# Patient Record
Sex: FEMALE | Race: WHITE | NOT HISPANIC OR LATINO | Employment: OTHER | URBAN - METROPOLITAN AREA
[De-identification: names, ages, dates, MRNs, and addresses within clinical notes are randomized per-mention and may not be internally consistent; named-entity substitution may affect disease eponyms.]

---

## 2019-01-15 ENCOUNTER — HISTORICAL (OUTPATIENT)
Dept: ADMINISTRATIVE | Facility: HOSPITAL | Age: 84
End: 2019-01-15

## 2019-01-22 ENCOUNTER — HISTORICAL (OUTPATIENT)
Dept: ADMINISTRATIVE | Facility: HOSPITAL | Age: 84
End: 2019-01-22

## 2019-02-05 ENCOUNTER — HISTORICAL (OUTPATIENT)
Dept: ADMINISTRATIVE | Facility: HOSPITAL | Age: 84
End: 2019-02-05

## 2019-12-02 ENCOUNTER — HISTORICAL (OUTPATIENT)
Dept: ADMINISTRATIVE | Facility: HOSPITAL | Age: 84
End: 2019-12-02

## 2019-12-05 LAB — FINAL CULTURE: NORMAL

## 2022-04-11 ENCOUNTER — HISTORICAL (OUTPATIENT)
Dept: ADMINISTRATIVE | Facility: HOSPITAL | Age: 87
End: 2022-04-11

## 2022-04-29 VITALS
SYSTOLIC BLOOD PRESSURE: 130 MMHG | HEIGHT: 62 IN | WEIGHT: 200 LBS | BODY MASS INDEX: 36.8 KG/M2 | DIASTOLIC BLOOD PRESSURE: 80 MMHG

## 2023-05-22 ENCOUNTER — HOSPITAL ENCOUNTER (INPATIENT)
Facility: HOSPITAL | Age: 88
LOS: 1 days | Discharge: HOME HEALTH | DRG: 811 | End: 2023-05-24
Attending: EMERGENCY MEDICINE | Admitting: INTERNAL MEDICINE
Payer: MEDICARE

## 2023-05-22 DIAGNOSIS — R07.9 CHEST PAIN: ICD-10-CM

## 2023-05-22 DIAGNOSIS — D64.9 ANEMIA: ICD-10-CM

## 2023-05-22 DIAGNOSIS — R06.02 SHORTNESS OF BREATH AT REST: ICD-10-CM

## 2023-05-22 DIAGNOSIS — R06.02 SOB (SHORTNESS OF BREATH): ICD-10-CM

## 2023-05-22 LAB
ABORH RETYPE: NORMAL
ALBUMIN SERPL-MCNC: 3.6 G/DL (ref 3.4–4.8)
ALBUMIN/GLOB SERPL: 1.2 RATIO (ref 1.1–2)
ALP SERPL-CCNC: 87 UNIT/L (ref 40–150)
ALT SERPL-CCNC: 9 UNIT/L (ref 0–55)
APTT PPP: 32.8 SECONDS (ref 23.2–33.7)
AST SERPL-CCNC: 10 UNIT/L (ref 5–34)
BASOPHILS # BLD AUTO: 0.05 X10(3)/MCL
BASOPHILS NFR BLD AUTO: 0.7 %
BILIRUBIN DIRECT+TOT PNL SERPL-MCNC: 0.5 MG/DL
BUN SERPL-MCNC: 26.3 MG/DL (ref 9.8–20.1)
CALCIUM SERPL-MCNC: 9.4 MG/DL (ref 8.4–10.2)
CHLORIDE SERPL-SCNC: 110 MMOL/L (ref 98–107)
CK SERPL-CCNC: 68 U/L (ref 29–168)
CO2 SERPL-SCNC: 19 MMOL/L (ref 23–31)
CREAT SERPL-MCNC: 1.94 MG/DL (ref 0.55–1.02)
DIRECT COOMBS (DAT): NORMAL
EOSINOPHIL # BLD AUTO: 0.24 X10(3)/MCL (ref 0–0.9)
EOSINOPHIL NFR BLD AUTO: 3.4 %
ERYTHROCYTE [DISTWIDTH] IN BLOOD BY AUTOMATED COUNT: 15.5 % (ref 11.5–17)
FOLATE SERPL-MCNC: 8.5 NG/ML (ref 7–31.4)
GFR SERPLBLD CREATININE-BSD FMLA CKD-EPI: 25 MLS/MIN/1.73/M2
GLOBULIN SER-MCNC: 2.9 GM/DL (ref 2.4–3.5)
GLUCOSE SERPL-MCNC: 182 MG/DL (ref 82–115)
GROUP & RH: NORMAL
HCT VFR BLD AUTO: 26 % (ref 37–47)
HGB BLD-MCNC: 7.6 G/DL (ref 12–16)
IMM GRANULOCYTES # BLD AUTO: 0.08 X10(3)/MCL (ref 0–0.04)
IMM GRANULOCYTES NFR BLD AUTO: 1.1 %
INDIRECT COOMBS GEL: NORMAL
INR BLD: 1.06 (ref 0–1.3)
IRON SATN MFR SERPL: 5 % (ref 20–50)
IRON SERPL-MCNC: 18 UG/DL (ref 50–170)
LDH SERPL-CCNC: 181 U/L (ref 125–220)
LYMPHOCYTES # BLD AUTO: 0.92 X10(3)/MCL (ref 0.6–4.6)
LYMPHOCYTES NFR BLD AUTO: 13.1 %
MCH RBC QN AUTO: 26.8 PG (ref 27–31)
MCHC RBC AUTO-ENTMCNC: 29.2 G/DL (ref 33–36)
MCV RBC AUTO: 91.5 FL (ref 80–94)
MONOCYTES # BLD AUTO: 0.67 X10(3)/MCL (ref 0.1–1.3)
MONOCYTES NFR BLD AUTO: 9.5 %
NEUTROPHILS # BLD AUTO: 5.07 X10(3)/MCL (ref 2.1–9.2)
NEUTROPHILS NFR BLD AUTO: 72.2 %
NRBC BLD AUTO-RTO: 0 %
PLATELET # BLD AUTO: 364 X10(3)/MCL (ref 130–400)
PMV BLD AUTO: 9.4 FL (ref 7.4–10.4)
POCT GLUCOSE: 131 MG/DL (ref 70–110)
POTASSIUM SERPL-SCNC: 4.4 MMOL/L (ref 3.5–5.1)
PROT SERPL-MCNC: 6.5 GM/DL (ref 5.8–7.6)
PROTHROMBIN TIME: 13.7 SECONDS (ref 12.5–14.5)
RBC # BLD AUTO: 2.84 X10(6)/MCL (ref 4.2–5.4)
RET# (OHS): 0.05 (ref 0.02–0.08)
RETICULOCYTE COUNT AUTOMATED (OLG): 1.86 % (ref 1.1–2.1)
SODIUM SERPL-SCNC: 139 MMOL/L (ref 136–145)
SPECIMEN OUTDATE: NORMAL
TIBC SERPL-MCNC: 333 UG/DL (ref 70–310)
TIBC SERPL-MCNC: 351 UG/DL (ref 250–450)
TRANSFERRIN SERPL-MCNC: 327 MG/DL
TROPONIN I SERPL-MCNC: 0.01 NG/ML (ref 0–0.04)
VIT B12 SERPL-MCNC: 290 PG/ML (ref 213–816)
WBC # SPEC AUTO: 7.03 X10(3)/MCL (ref 4.5–11.5)

## 2023-05-22 PROCEDURE — 80053 COMPREHEN METABOLIC PANEL: CPT | Performed by: PHYSICIAN ASSISTANT

## 2023-05-22 PROCEDURE — 82550 ASSAY OF CK (CPK): CPT | Performed by: EMERGENCY MEDICINE

## 2023-05-22 PROCEDURE — 85730 THROMBOPLASTIN TIME PARTIAL: CPT | Performed by: PHYSICIAN ASSISTANT

## 2023-05-22 PROCEDURE — 85025 COMPLETE CBC W/AUTO DIFF WBC: CPT | Performed by: PHYSICIAN ASSISTANT

## 2023-05-22 PROCEDURE — 82746 ASSAY OF FOLIC ACID SERUM: CPT | Performed by: EMERGENCY MEDICINE

## 2023-05-22 PROCEDURE — 82962 GLUCOSE BLOOD TEST: CPT

## 2023-05-22 PROCEDURE — P9016 RBC LEUKOCYTES REDUCED: HCPCS | Performed by: EMERGENCY MEDICINE

## 2023-05-22 PROCEDURE — 83615 LACTATE (LD) (LDH) ENZYME: CPT | Performed by: EMERGENCY MEDICINE

## 2023-05-22 PROCEDURE — 86880 COOMBS TEST DIRECT: CPT | Performed by: EMERGENCY MEDICINE

## 2023-05-22 PROCEDURE — 86923 COMPATIBILITY TEST ELECTRIC: CPT | Performed by: EMERGENCY MEDICINE

## 2023-05-22 PROCEDURE — 82607 VITAMIN B-12: CPT | Performed by: EMERGENCY MEDICINE

## 2023-05-22 PROCEDURE — 83550 IRON BINDING TEST: CPT | Performed by: EMERGENCY MEDICINE

## 2023-05-22 PROCEDURE — 85045 AUTOMATED RETICULOCYTE COUNT: CPT | Performed by: EMERGENCY MEDICINE

## 2023-05-22 PROCEDURE — 85610 PROTHROMBIN TIME: CPT | Performed by: PHYSICIAN ASSISTANT

## 2023-05-22 PROCEDURE — 84484 ASSAY OF TROPONIN QUANT: CPT | Performed by: PHYSICIAN ASSISTANT

## 2023-05-22 PROCEDURE — 93005 ELECTROCARDIOGRAM TRACING: CPT

## 2023-05-22 PROCEDURE — 63600175 PHARM REV CODE 636 W HCPCS: Performed by: EMERGENCY MEDICINE

## 2023-05-22 PROCEDURE — 83880 ASSAY OF NATRIURETIC PEPTIDE: CPT | Performed by: EMERGENCY MEDICINE

## 2023-05-22 PROCEDURE — 86900 BLOOD TYPING SEROLOGIC ABO: CPT | Performed by: PHYSICIAN ASSISTANT

## 2023-05-22 PROCEDURE — 96375 TX/PRO/DX INJ NEW DRUG ADDON: CPT

## 2023-05-22 PROCEDURE — 25000003 PHARM REV CODE 250: Performed by: EMERGENCY MEDICINE

## 2023-05-22 PROCEDURE — 96374 THER/PROPH/DIAG INJ IV PUSH: CPT

## 2023-05-22 RX ORDER — LABETALOL HYDROCHLORIDE 5 MG/ML
10 INJECTION, SOLUTION INTRAVENOUS EVERY 4 HOURS PRN
Status: DISCONTINUED | OUTPATIENT
Start: 2023-05-22 | End: 2023-05-24 | Stop reason: HOSPADM

## 2023-05-22 RX ORDER — GABAPENTIN 300 MG/1
300 CAPSULE ORAL 3 TIMES DAILY
COMMUNITY

## 2023-05-22 RX ORDER — POLYETHYLENE GLYCOL 3350 17 G/17G
17 POWDER, FOR SOLUTION ORAL DAILY
COMMUNITY

## 2023-05-22 RX ORDER — LABETALOL HYDROCHLORIDE 5 MG/ML
10 INJECTION, SOLUTION INTRAVENOUS
Status: COMPLETED | OUTPATIENT
Start: 2023-05-22 | End: 2023-05-22

## 2023-05-22 RX ORDER — ALBUTEROL SULFATE 0.63 MG/3ML
0.63 SOLUTION RESPIRATORY (INHALATION) EVERY 6 HOURS PRN
COMMUNITY
End: 2023-05-22 | Stop reason: CLARIF

## 2023-05-22 RX ORDER — CHLORTHALIDONE 25 MG/1
25 TABLET ORAL DAILY
Status: ON HOLD | COMMUNITY
End: 2023-07-13 | Stop reason: HOSPADM

## 2023-05-22 RX ORDER — FUROSEMIDE 10 MG/ML
40 INJECTION INTRAMUSCULAR; INTRAVENOUS
Status: COMPLETED | OUTPATIENT
Start: 2023-05-22 | End: 2023-05-22

## 2023-05-22 RX ORDER — HYDROCODONE BITARTRATE AND ACETAMINOPHEN 500; 5 MG/1; MG/1
TABLET ORAL
Status: DISCONTINUED | OUTPATIENT
Start: 2023-05-22 | End: 2023-05-24 | Stop reason: HOSPADM

## 2023-05-22 RX ORDER — POTASSIUM CHLORIDE 750 MG/1
10 TABLET, EXTENDED RELEASE ORAL DAILY
Status: ON HOLD | COMMUNITY
End: 2023-07-13 | Stop reason: HOSPADM

## 2023-05-22 RX ORDER — ALBUTEROL SULFATE 90 UG/1
2 AEROSOL, METERED RESPIRATORY (INHALATION) EVERY 6 HOURS PRN
COMMUNITY

## 2023-05-22 RX ADMIN — FUROSEMIDE 40 MG: 10 INJECTION, SOLUTION INTRAMUSCULAR; INTRAVENOUS at 09:05

## 2023-05-22 RX ADMIN — LABETALOL HYDROCHLORIDE 10 MG: 5 INJECTION, SOLUTION INTRAVENOUS at 11:05

## 2023-05-22 NOTE — FIRST PROVIDER EVALUATION
Medical screening examination initiated.  I have conducted a focused provider triage encounter, findings are as follows:    Brief history of present illness:  87-year-old female presents to ED for evaluation of increasing shortness of breath and bilateral leg swelling.  States that she saw her PCP with outpatient blood work called and told that she had low blood counts.  Denies any bleeding or dark stools.    Vitals:    05/22/23 1109   BP: (!) 149/61   Pulse: 85   Resp: 18   Temp: 99.3 °F (37.4 °C)   TempSrc: Oral   SpO2: 96%   Weight: 90.7 kg (200 lb)   Height: 5' (1.524 m)       Pertinent physical exam:  Patient awake and alert ambulatory into triage.    Brief workup plan:  Labs, EKG, chest x-ray, type and screen    Preliminary workup initiated; this workup will be continued and followed by the physician or advanced practice provider that is assigned to the patient when roomed.

## 2023-05-22 NOTE — ED NOTES
Pt alert and oriented. Lobby round. Vitals assessed . Pt does not appear to be in any immediate distress at this time.

## 2023-05-23 PROBLEM — R06.02 SHORTNESS OF BREATH: Status: ACTIVE | Noted: 2023-05-23

## 2023-05-23 LAB
ABO + RH BLD: NORMAL
ABO + RH BLD: NORMAL
AV INDEX (PROSTH): 0.4
AV MEAN GRADIENT: 16 MMHG
AV PEAK GRADIENT: 30 MMHG
AV VALVE AREA: 1.25 CM2
AV VELOCITY RATIO: 0.51
BLD PROD TYP BPU: NORMAL
BLD PROD TYP BPU: NORMAL
BLOOD UNIT EXPIRATION DATE: NORMAL
BLOOD UNIT EXPIRATION DATE: NORMAL
BLOOD UNIT TYPE CODE: 5100
BLOOD UNIT TYPE CODE: 5100
BNP BLD-MCNC: 84.4 PG/ML
BSA FOR ECHO PROCEDURE: 1.96 M2
CROSSMATCH INTERPRETATION: NORMAL
CROSSMATCH INTERPRETATION: NORMAL
CV ECHO LV RWT: 0.57 CM
DISPENSE STATUS: NORMAL
DISPENSE STATUS: NORMAL
DOP CALC AO PEAK VEL: 2.73 M/S
DOP CALC AO VTI: 57.8 CM
DOP CALC LVOT AREA: 3.1 CM2
DOP CALC LVOT DIAMETER: 2 CM
DOP CALC LVOT PEAK VEL: 1.39 M/S
DOP CALC LVOT STROKE VOLUME: 72.22 CM3
DOP CALC MV VTI: 42.6 CM
DOP CALCLVOT PEAK VEL VTI: 23 CM
E WAVE DECELERATION TIME: 277 MSEC
E/A RATIO: 0.76
E/E' RATIO: 14.77 M/S
ECHO LV POSTERIOR WALL: 1.21 CM (ref 0.6–1.1)
EJECTION FRACTION: 55 %
FRACTIONAL SHORTENING: 35 % (ref 28–44)
HCT VFR BLD AUTO: 31.7 % (ref 37–47)
HGB BLD-MCNC: 10.1 G/DL (ref 12–16)
INTERVENTRICULAR SEPTUM: 1.34 CM (ref 0.6–1.1)
LEFT ATRIUM SIZE: 4.2 CM
LEFT ATRIUM VOLUME INDEX MOD: 28.1 ML/M2
LEFT ATRIUM VOLUME MOD: 52.6 CM3
LEFT INTERNAL DIMENSION IN SYSTOLE: 2.73 CM (ref 2.1–4)
LEFT VENTRICLE DIASTOLIC VOLUME INDEX: 42.51 ML/M2
LEFT VENTRICLE DIASTOLIC VOLUME: 79.5 ML
LEFT VENTRICLE MASS INDEX: 105 G/M2
LEFT VENTRICLE SYSTOLIC VOLUME INDEX: 14.9 ML/M2
LEFT VENTRICLE SYSTOLIC VOLUME: 27.8 ML
LEFT VENTRICULAR INTERNAL DIMENSION IN DIASTOLE: 4.22 CM (ref 3.5–6)
LEFT VENTRICULAR MASS: 196.23 G
LV LATERAL E/E' RATIO: 13.71 M/S
LV SEPTAL E/E' RATIO: 16 M/S
LVOT MG: 5 MMHG
LVOT MV: 0.96 CM/S
MV MEAN GRADIENT: 3 MMHG
MV PEAK A VEL: 1.26 M/S
MV PEAK E VEL: 0.96 M/S
MV PEAK GRADIENT: 8 MMHG
MV STENOSIS PRESSURE HALF TIME: 96 MS
MV VALVE AREA BY CONTINUITY EQUATION: 1.7 CM2
MV VALVE AREA P 1/2 METHOD: 2.29 CM2
OHS LV EJECTION FRACTION SIMPSONS BIPLANE MOD: 6 %
PISA TR MAX VEL: 2.51 M/S
POCT GLUCOSE: 124 MG/DL (ref 70–110)
POCT GLUCOSE: 126 MG/DL (ref 70–110)
POCT GLUCOSE: 158 MG/DL (ref 70–110)
POCT GLUCOSE: 183 MG/DL (ref 70–110)
PV PEAK VELOCITY: 1.3 CM/S
RA PRESSURE: 3 MMHG
TDI LATERAL: 0.07 M/S
TDI SEPTAL: 0.06 M/S
TDI: 0.07 M/S
TR MAX PG: 25 MMHG
TRICUSPID ANNULAR PLANE SYSTOLIC EXCURSION: 3.26 CM
TV REST PULMONARY ARTERY PRESSURE: 28 MMHG
UNIT NUMBER: NORMAL
UNIT NUMBER: NORMAL

## 2023-05-23 PROCEDURE — 99285 EMERGENCY DEPT VISIT HI MDM: CPT | Mod: 25

## 2023-05-23 PROCEDURE — 85014 HEMATOCRIT: CPT | Performed by: INTERNAL MEDICINE

## 2023-05-23 PROCEDURE — 11000001 HC ACUTE MED/SURG PRIVATE ROOM

## 2023-05-23 PROCEDURE — 25000003 PHARM REV CODE 250: Performed by: NURSE PRACTITIONER

## 2023-05-23 PROCEDURE — 36430 TRANSFUSION BLD/BLD COMPNT: CPT

## 2023-05-23 PROCEDURE — 25000003 PHARM REV CODE 250: Performed by: INTERNAL MEDICINE

## 2023-05-23 PROCEDURE — P9016 RBC LEUKOCYTES REDUCED: HCPCS | Performed by: EMERGENCY MEDICINE

## 2023-05-23 RX ORDER — FLUTICASONE PROPIONATE AND SALMETEROL 50; 250 UG/1; UG/1
1 POWDER RESPIRATORY (INHALATION) 2 TIMES DAILY
COMMUNITY
Start: 2023-05-19

## 2023-05-23 RX ORDER — CARVEDILOL 3.12 MG/1
3.12 TABLET ORAL 2 TIMES DAILY
Status: DISCONTINUED | OUTPATIENT
Start: 2023-05-23 | End: 2023-05-24 | Stop reason: HOSPADM

## 2023-05-23 RX ORDER — ATORVASTATIN CALCIUM 10 MG/1
10 TABLET, FILM COATED ORAL DAILY
Status: DISCONTINUED | OUTPATIENT
Start: 2023-05-23 | End: 2023-05-24 | Stop reason: HOSPADM

## 2023-05-23 RX ORDER — SERTRALINE HYDROCHLORIDE 50 MG/1
100 TABLET, FILM COATED ORAL DAILY
Status: DISCONTINUED | OUTPATIENT
Start: 2023-05-23 | End: 2023-05-24 | Stop reason: HOSPADM

## 2023-05-23 RX ORDER — FLUTICASONE FUROATE AND VILANTEROL 200; 25 UG/1; UG/1
1 POWDER RESPIRATORY (INHALATION) DAILY
Status: DISCONTINUED | OUTPATIENT
Start: 2023-05-23 | End: 2023-05-24 | Stop reason: HOSPADM

## 2023-05-23 RX ORDER — GLIPIZIDE 5 MG/1
5 TABLET, FILM COATED, EXTENDED RELEASE ORAL
Status: DISCONTINUED | OUTPATIENT
Start: 2023-05-24 | End: 2023-05-24 | Stop reason: HOSPADM

## 2023-05-23 RX ORDER — NALOXONE HCL 0.4 MG/ML
0.02 VIAL (ML) INJECTION
Status: DISCONTINUED | OUTPATIENT
Start: 2023-05-23 | End: 2023-05-24 | Stop reason: HOSPADM

## 2023-05-23 RX ORDER — IBUPROFEN 200 MG
16 TABLET ORAL
Status: DISCONTINUED | OUTPATIENT
Start: 2023-05-23 | End: 2023-05-24 | Stop reason: HOSPADM

## 2023-05-23 RX ORDER — SODIUM CHLORIDE 0.9 % (FLUSH) 0.9 %
10 SYRINGE (ML) INJECTION EVERY 12 HOURS PRN
Status: DISCONTINUED | OUTPATIENT
Start: 2023-05-23 | End: 2023-05-24 | Stop reason: HOSPADM

## 2023-05-23 RX ORDER — ALPRAZOLAM 0.5 MG/1
1 TABLET ORAL DAILY PRN
Status: DISCONTINUED | OUTPATIENT
Start: 2023-05-23 | End: 2023-05-24 | Stop reason: HOSPADM

## 2023-05-23 RX ORDER — IBUPROFEN 200 MG
24 TABLET ORAL
Status: DISCONTINUED | OUTPATIENT
Start: 2023-05-23 | End: 2023-05-24 | Stop reason: HOSPADM

## 2023-05-23 RX ORDER — GLUCAGON 1 MG
1 KIT INJECTION
Status: DISCONTINUED | OUTPATIENT
Start: 2023-05-23 | End: 2023-05-24 | Stop reason: HOSPADM

## 2023-05-23 RX ORDER — FOLIC ACID 1 MG/1
1 TABLET ORAL DAILY
Status: DISCONTINUED | OUTPATIENT
Start: 2023-05-23 | End: 2023-05-24 | Stop reason: HOSPADM

## 2023-05-23 RX ORDER — ALBUTEROL SULFATE 90 UG/1
2 AEROSOL, METERED RESPIRATORY (INHALATION) EVERY 6 HOURS PRN
Status: DISCONTINUED | OUTPATIENT
Start: 2023-05-23 | End: 2023-05-24 | Stop reason: HOSPADM

## 2023-05-23 RX ORDER — ONDANSETRON 2 MG/ML
4 INJECTION INTRAMUSCULAR; INTRAVENOUS EVERY 6 HOURS PRN
Status: DISCONTINUED | OUTPATIENT
Start: 2023-05-23 | End: 2023-05-24 | Stop reason: HOSPADM

## 2023-05-23 RX ORDER — INSULIN ASPART 100 [IU]/ML
0-5 INJECTION, SOLUTION INTRAVENOUS; SUBCUTANEOUS
Status: DISCONTINUED | OUTPATIENT
Start: 2023-05-23 | End: 2023-05-24 | Stop reason: HOSPADM

## 2023-05-23 RX ORDER — AMLODIPINE BESYLATE 5 MG/1
5 TABLET ORAL DAILY
Status: DISCONTINUED | OUTPATIENT
Start: 2023-05-23 | End: 2023-05-23

## 2023-05-23 RX ORDER — FLUTICASONE PROPIONATE AND SALMETEROL 250; 50 UG/1; UG/1
1 POWDER RESPIRATORY (INHALATION) 2 TIMES DAILY
Status: DISCONTINUED | OUTPATIENT
Start: 2023-05-23 | End: 2023-05-23 | Stop reason: CLARIF

## 2023-05-23 RX ORDER — ACETAMINOPHEN 325 MG/1
650 TABLET ORAL EVERY 4 HOURS PRN
Status: DISCONTINUED | OUTPATIENT
Start: 2023-05-23 | End: 2023-05-24 | Stop reason: HOSPADM

## 2023-05-23 RX ORDER — ALPRAZOLAM 1 MG/1
1 TABLET ORAL DAILY PRN
Status: ON HOLD | COMMUNITY
End: 2023-07-13 | Stop reason: HOSPADM

## 2023-05-23 RX ADMIN — AMLODIPINE BESYLATE 5 MG: 5 TABLET ORAL at 11:05

## 2023-05-23 RX ADMIN — ALPRAZOLAM 1 MG: 0.5 TABLET ORAL at 11:05

## 2023-05-23 RX ADMIN — ALPRAZOLAM 1 MG: 0.5 TABLET ORAL at 09:05

## 2023-05-23 RX ADMIN — FOLIC ACID 1 MG: 1 TABLET ORAL at 09:05

## 2023-05-23 RX ADMIN — SERTRALINE HYDROCHLORIDE 100 MG: 50 TABLET ORAL at 11:05

## 2023-05-23 RX ADMIN — ATORVASTATIN CALCIUM 10 MG: 10 TABLET, FILM COATED ORAL at 11:05

## 2023-05-23 RX ADMIN — CARVEDILOL 3.12 MG: 3.12 TABLET, FILM COATED ORAL at 09:05

## 2023-05-23 NOTE — NURSING
Nurses Note -- 4 Eyes      5/23/2023   6:55 PM      Skin assessed during: Admit      [x] No Altered Skin Integrity Present    [x]Prevention Measures Documented      [] Yes- Altered Skin Integrity Present or Discovered   [] LDA Added if Not in Epic (Describe Wound)   [] New Altered Skin Integrity was Present on Admit and Documented in LDA   [] Wound Image Taken    Wound Care Consulted? No    Attending Nurse:  Arabella Buenrostro RN     Second RN/Staff Member:  Devante Sarkar

## 2023-05-23 NOTE — H&P
[facility] Hospital Medicine History & Physical Examination       Patient Name: Salma Mitchell  MRN: [id number]  Patient Class: IP- Inpatient   Admission Date: 5/22/2023   Admitting Physician: ANIKET Service   Length of Stay: 0  Attending Physician: Dr. Gomez  Primary Care Provider: Jv Mead II, MD  Face-to-Face encounter date: 05/23/2023  Code Status:Full Code  Chief Complaint: Anemia (Pt states she began having sob and lower leg swelling for a few weeks. Denies dark stools. Sent to ed to be admitted for low blood count)        Patient information was obtained from patient, patient's family, past medical records and ER records.     HISTORY OF PRESENT ILLNESS:   The patient is an 87 year old CF with a PMHX of asthma, COPD, Diabetes -Type II, hypertension, hyperlipidemia, and cataracts. She reports being more SOB most recently, however she presented with swelling in BLEs and was instructed to report to the ED due to a low blood count. She reports left hip and LLE pain for which she received 2 ESIs over the past 2 months which have greatly improved her lower back and LLE pain. The patient was seen and evaluated in the ED. Denies any fever, chills, night sweats, mucopurulent sputum, or hemoptysis. Denies vomiting blood and passing blood in the stool. Follows with Dr. Rodo Padilla, for asthma/COPD. Stool was negative for occult blood.     PAST MEDICAL HISTORY:     Past Medical History:   Diagnosis Date    Asthma     Diabetes mellitus     HLD (hyperlipidemia)     Hypertension     RONAL (obstructive sleep apnea)     SOB (shortness of breath)     Unspecified cataract        PAST SURGICAL HISTORY:     Past Surgical History:   Procedure Laterality Date    CHOLECYSTECTOMY      HYSTERECTOMY         ALLERGIES:   Adhesive, Center-al house dust, Dairy aid [lactase], Grass pollen-june grass standard, Lexapro [escitalopram], Pcn [penicillins], and Shellfish containing products    FAMILY HISTORY:    Reviewed and negative    SOCIAL HISTORY:     Social History     Tobacco Use    Smoking status: Former    Smokeless tobacco: Never   Substance Use Topics    Alcohol use: Not on file    Previous hx 23 year pack hx of cigarette smoking; quit smoking 16-17 years ago; lives alone and remains independent; no ETOH or illicit drug use.    HOME MEDICATIONS:     Prior to Admission medications    Medication Sig Start Date End Date Taking? Authorizing Provider   albuterol (PROVENTIL/VENTOLIN HFA) 90 mcg/actuation inhaler Inhale 2 puffs into the lungs every 6 (six) hours as needed for Wheezing. Rescue   Yes Historical Provider   amLODIPine (NORVASC) 5 MG tablet Take 5 mg by mouth once daily.   Yes Historical Provider   atorvastatin (LIPITOR) 10 MG tablet Take 10 mg by mouth once daily.   Yes Historical Provider   chlorthalidone (HYGROTEN) 25 MG Tab Take 25 mg by mouth once daily. Take 2 tabs   Yes Historical Provider   gabapentin (NEURONTIN) 300 MG capsule Take 300 mg by mouth 3 (three) times daily.   Yes Historical Provider   glipiZIDE (GLUCOTROL) 10 MG TR24 Take 5 mg by mouth daily with breakfast.   Yes Historical Provider   losartan (COZAAR) 100 MG tablet Take 100 mg by mouth once daily.   Yes Historical Provider   pantoprazole (PROTONIX) 40 MG tablet Take 40 mg by mouth once daily.   Yes Historical Provider   polyethylene glycol (GLYCOLAX) 17 gram PwPk Take 17 g by mouth once daily.   Yes Historical Provider   potassium chloride SA (K-DUR,KLOR-CON M) 10 MEQ tablet Take 10 mEq by mouth once daily. Take 2 tabs   Yes Historical Provider   sertraline (ZOLOFT) 100 MG tablet Take 100 mg by mouth once daily.   Yes Historical Provider       REVIEW OF SYSTEMS:   Except as documented, all other systems reviewed and negative     PHYSICAL EXAM:     VITAL SIGNS: 24 HRS MIN & MAX LAST   Temp  Min: 98.4 °F (36.9 °C)  Max: 99.3 °F (37.4 °C) 99 °F (37.2 °C)   BP  Min: 144/86  Max: 200/76 (!) 178/58   Pulse  Min: 70  Max: 92  76   Resp   Min: 16  Max: 22 19   SpO2  Min: 95 %  Max: 99 % 97 %       General appearance: Elderly CF sitting up on ED stretcher in no apparent distress.  HENT: Atraumatic head, normocephalic,  Moist mucous membranes of oral cavity.  Eyes: Normal extraocular movements, scler  Neck: Supple, no JVD  Lungs: BBS clear to upper lobes, diminished to bases, no crackles or wheezing  Heart: Regular rate and rhythm. S1 and S2 present with grade IV/VI systolic murmur heard over aortic region;   Abdomen: Soft, non-distended, non-tender. No rebound tenderness/guarding. Bowel sounds are normal.   Extremities: BLEs with 3+ pitting edema; RLE with mod. redness  Skin: No Rash.   Neuro: Motor and sensory exams grossly intact. Good tone. Muscle strength 5/5 in all 4 extremities  Psych/mental status: Appropriate mood and affect. Responds appropriately to questions.     LABS AND IMAGING:     Recent Labs   Lab 05/22/23  1131   WBC 7.03   RBC 2.84*   HGB 7.6*   HCT 26.0*   MCV 91.5   MCH 26.8*   MCHC 29.2*   RDW 15.5      MPV 9.4       Recent Labs   Lab 05/22/23  1130      K 4.4   CO2 19*   BUN 26.3*   CREATININE 1.94*   CALCIUM 9.4   ALBUMIN 3.6   ALKPHOS 87   ALT 9   AST 10   BILITOT 0.5       Microbiology Results (last 7 days)       ** No results found for the last 168 hours. **             X-Ray Chest 1 View  Narrative: EXAMINATION:  XR CHEST 1 VIEW    CLINICAL HISTORY:  chf;    TECHNIQUE:  Single view of the chest    COMPARISON:  No prior imaging available for comparison.    FINDINGS:  No focal opacification, pleural effusion, or pneumothorax.    Left cardiomediastinal silhouette remains prominent.    No acute osseous abnormality.  Impression: No acute cardiopulmonary process.    Electronically signed by: Vaughn Shaw  Date:    05/23/2023  Time:    07:23        ASSESSMENT & PLAN:   Impression:  Symptomatic anemia - s/p transfusion repeat H/H-10.1/31.7  Hx of COPD  ALECIA  Hypertension  DM Type II    Plan:  Received 2 units PRBC &  Lasix 40 mg x 1  Obtain 2-d echo  Resume Advair and Proventil prn            VTE Prophylaxis: will be placed on SCDs for DVT prophylaxis and will be advised to be as mobile as possible and sit in a chair as tolerated    Patient condition:  Stable/Fair/Guarded/ Serious/ Critical    __________________________________________________________________________  INPATIENT LIST OF MEDICATIONS     Scheduled Meds:  Continuous Infusions:  PRN Meds:.sodium chloride, acetaminophen, dextrose 10%, dextrose 10%, glucagon (human recombinant), glucose, glucose, insulin aspart U-100, labetaloL, naloxone, ondansetron, sodium chloride 0.9%      Sonia DANGELO NP have reviewed and discussed the case with Dr. Gomez  Please see the following addendum for further assessment and plan from there attending MD.    05/23/2023    Dr. Gomez-chart reviewed patient examined.  We have an 87-year-old female with history asthma/COPD/diabetes type 2/hypertension/hyperlipidemia and cataract presents to emergency room [facility] complaining of progressive shortness of breath.  Upon arrival she was found to have a hemoglobin of 7.6 and a creatinine of around 1.94.  Patient refers a couple of years ago she was seen by a nurse associated with Dr. Faizan Skelton with renal.  In the emergency room she is Hemoccult negative, workup of anemia revealed iron-deficiency anemia with low iron/low iron sat and a borderline low folate level.  She is status post 2 units PRBC.  She had an  echocardiogram with preserved ejection fraction /diastolic dysfunction stage III.  At the present moment she refers feeling well, we will start patient on ferrlicit 125 mg IV daily, retroperitoneal ultrasound, we will consult Dr. Faizan Skelton for evaluation and outpatient follow-up.  Will attempt to get some all labs from her primary care physician Dr. Jv Sullivan.  On physical examination she is alert/active and oriented x3/no in acute  distress.  Afebrile.  Normocephalic with pupils equal round reactive to light and accommodation, extraocular muscles are intact.  Ears were not examined.  Neck is supple/ lungs are clear to auscultation, heart S1-S2 no S3 abdomen is globous, soft ,depressible, non-tender, non-distended / extremities are symmetrical with 2+ edema.  Neurologic examination is grossly unremarkable.     Assessment:  As above.  ferrlicit 125 mg  We will DC Norvasc start patient on Coreg 3.125 mg p.o. b.i.d.  Continue ARB  Retroperitoneal ultrasound  Renal consultation with Dr. Faizan Skelton    Agree with assessment and plans done by nurse practitioner Ms. romero.  Some corrections were made to HPI/physical examination as well as assessment and plans at the time of my evaluation.    Discharge Planning and Disposition: No mobility needs. Ambulating well. Good social support system.   Anticipated discharge    All diagnosis and differential diagnosis have been reviewed; assessment and plan has been documented; I have personally reviewed the labs and test results that are presently available; I have reviewed the patients medication list; I have reviewed the consulting providers response and recommendations. I have reviewed or attempted to review medical records based upon their availability.    All of the patient and family questions have been addressed and answered. Patient's is agreeable to the above stated plan. I will continue to monitor closely and make adjustments to medical management as needed.      Dipika SUTHERLAND  05/23/2023

## 2023-05-23 NOTE — ED PROVIDER NOTES
Encounter Date: 5/22/2023    SCRIBE #1 NOTE: I, Jose Baker, am scribing for, and in the presence of,  Dr. Benítez. I have scribed the following portions of the note - the EKG reading. Other sections scribed: HPI, ROS, Physical Exam, MDM, Attending.     History     Chief Complaint   Patient presents with    Anemia     Pt states she began having sob and lower leg swelling for a few weeks. Denies dark stools. Sent to ed to be admitted for low blood count     86 y/o female with history of HTN, HLD, DM, asthma and RONAL presents to ED for SOB and LE swelling onset a few weeks ago.  Pt says she received a call from Dr. Mead this morning telling her to come here for admission due to low hemoglobin.  She says she does not take blood thinners and has no history of anemia.  Pt states her SOB is chronic, and she also complains of orthopnea and weight gain.  She denies dark stool, blood in stool or chest pain.    The history is provided by the patient.   Anemia  This is a new problem. Episode onset: few weeks ago. The problem occurs constantly. The problem has been gradually worsening. Associated symptoms include shortness of breath (chronic). Pertinent negatives include no chest pain, no abdominal pain and no headaches.   Review of patient's allergies indicates:   Allergen Reactions    Adhesive     Center-al house dust     Dairy aid [lactase]     Grass pollen-june grass standard     Lexapro [escitalopram]     Pcn [penicillins]     Shellfish containing products      Past Medical History:   Diagnosis Date    Asthma     Diabetes mellitus     HLD (hyperlipidemia)     Hypertension     RONAL (obstructive sleep apnea)     SOB (shortness of breath)     Unspecified cataract      Past Surgical History:   Procedure Laterality Date    CHOLECYSTECTOMY      HYSTERECTOMY       No family history on file.  Social History     Tobacco Use    Smoking status: Former    Smokeless tobacco: Never     Review of Systems   Constitutional:  Positive for  unexpected weight change. Negative for chills and fever.   HENT:  Negative for congestion and ear pain.    Eyes:  Negative for discharge.   Respiratory:  Positive for shortness of breath (chronic). Negative for cough and wheezing.    Cardiovascular:  Positive for leg swelling. Negative for chest pain.   Gastrointestinal:  Negative for abdominal pain, blood in stool, constipation, diarrhea, nausea and vomiting.   Genitourinary:  Negative for dysuria, flank pain and frequency.   Musculoskeletal:  Negative for back pain and joint swelling.   Skin:  Negative for rash.   Neurological:  Negative for dizziness, weakness and headaches.   Psychiatric/Behavioral:  Negative for agitation, confusion and hallucinations.      Physical Exam     Initial Vitals [05/22/23 1109]   BP Pulse Resp Temp SpO2   (!) 149/61 85 18 99.3 °F (37.4 °C) 96 %      MAP       --         Physical Exam    Nursing note and vitals reviewed.  Constitutional: She appears well-developed. No distress.   HENT:   Head: Normocephalic and atraumatic.   Mouth/Throat: Oropharynx is clear and moist.   Eyes: Conjunctivae and EOM are normal. Pupils are equal, round, and reactive to light.   Neck: Neck supple.   Cardiovascular:  Normal rate and regular rhythm.           Murmur heard.  Pulmonary/Chest: Breath sounds normal. No respiratory distress. She exhibits no tenderness.   Abdominal: Abdomen is soft. Bowel sounds are normal. She exhibits no distension. There is no abdominal tenderness.   Genitourinary:    Genitourinary Comments: Brown stool, hemoccult negative (female chaperone present - GIN Salazar)     Musculoskeletal:         General: Edema (2+ pitting bilateral LE) present. Normal range of motion.      Cervical back: Neck supple.      Lumbar back: Normal. Normal range of motion.     Neurological: She is alert and oriented to person, place, and time. She has normal strength. No cranial nerve deficit or sensory deficit.   Psychiatric: She has a normal mood and  affect. Judgment normal.       ED Course   Procedures  Labs Reviewed   COMPREHENSIVE METABOLIC PANEL - Abnormal; Notable for the following components:       Result Value    Chloride 110 (*)     Carbon Dioxide 19 (*)     Glucose Level 182 (*)     Blood Urea Nitrogen 26.3 (*)     Creatinine 1.94 (*)     All other components within normal limits   CBC WITH DIFFERENTIAL - Abnormal; Notable for the following components:    RBC 2.84 (*)     Hgb 7.6 (*)     Hct 26.0 (*)     MCH 26.8 (*)     MCHC 29.2 (*)     IG# 0.08 (*)     All other components within normal limits   IRON AND TIBC - Abnormal; Notable for the following components:    Iron Binding Capacity Unsaturated 333 (*)     Iron Level 18 (*)     Iron Saturation 5 (*)     All other components within normal limits   POCT GLUCOSE - Abnormal; Notable for the following components:    POCT Glucose 131 (*)     All other components within normal limits   APTT - Normal   PROTIME-INR - Normal   TROPONIN I - Normal   RETICULOCYTES - Normal   FOLATE - Normal   VITAMIN B12 - Normal   LACTATE DEHYDROGENASE - Normal   CK - Normal   B-TYPE NATRIURETIC PEPTIDE - Normal   CBC W/ AUTO DIFFERENTIAL    Narrative:     The following orders were created for panel order CBC auto differential.  Procedure                               Abnormality         Status                     ---------                               -----------         ------                     CBC with Differential[[id number]]        Abnormal            Final result                 Please view results for these tests on the individual orders.   URINALYSIS, REFLEX TO URINE CULTURE   POCT GLUCOSE, HAND-HELD DEVICE   TYPE & SCREEN   ABORH RETYPE   DIRECT ANTIGLOBULIN TEST   PREPARE RBC SOFT     EKG Readings: (Independently Interpreted)   Initial Reading: No STEMI. Rhythm: Normal Sinus Rhythm. Heart Rate: 83. Ectopy: No Ectopy. Conduction: Normal. ST Segments: Normal ST Segments. T Waves: Normal. Axis: Normal.   1110   ECG  Results              EKG 12-lead (Final result)  Result time 05/22/23 18:58:11      Final result by Interface, Lab In [facility] (05/22/23 18:58:11)                   Narrative:    Test Reason : R06.02,    Vent. Rate : 083 BPM     Atrial Rate : 083 BPM     P-R Int : 164 ms          QRS Dur : 084 ms      QT Int : 366 ms       P-R-T Axes : 067 041 073 degrees     QTc Int : 430 ms    Normal sinus rhythm  Nonspecific T wave abnormality  Abnormal ECG  No previous ECGs available  Confirmed by Vaughn Noland MD ([id number]) on 5/22/2023 6:58:04 PM    Referred By:             Confirmed By:Vaughn Noland MD                                  Imaging Results              X-Ray Chest 1 View (In process)                      Medications   0.9%  NaCl infusion (for blood administration) (has no administration in time range)   labetaloL injection 10 mg (has no administration in time range)   sodium chloride 0.9% flush 10 mL (has no administration in time range)   naloxone 0.4 mg/mL injection 0.02 mg (has no administration in time range)   glucose chewable tablet 16 g (has no administration in time range)   glucose chewable tablet 24 g (has no administration in time range)   glucagon (human recombinant) injection 1 mg (has no administration in time range)   dextrose 10% bolus 125 mL 125 mL (has no administration in time range)   dextrose 10% bolus 250 mL 250 mL (has no administration in time range)   acetaminophen tablet 650 mg (has no administration in time range)   ondansetron injection 4 mg (has no administration in time range)   insulin aspart U-100 injection 0-5 Units (has no administration in time range)   furosemide injection 40 mg (40 mg Intravenous Given 5/22/23 2155)   labetaloL injection 10 mg (10 mg Intravenous Given 5/22/23 2344)     Medical Decision Making:   Differential Diagnosis:   Differential diagnoses include, but are not limited to: GI bleed, folic deficiency, iron deficiency, CHF  Clinical Tests:   Lab Tests: Ordered  and Reviewed  Radiological Study: Ordered and Reviewed  Medical Tests: Ordered and Reviewed                        Clinical Impression:   Final diagnoses:  [R06.02] SOB (shortness of breath)  [D64.9] Anemia        ED Disposition Condition    Admit                 Emile Benítez MD  05/23/23 2612

## 2023-05-24 VITALS
SYSTOLIC BLOOD PRESSURE: 141 MMHG | RESPIRATION RATE: 18 BRPM | BODY MASS INDEX: 39.25 KG/M2 | TEMPERATURE: 99 F | DIASTOLIC BLOOD PRESSURE: 65 MMHG | OXYGEN SATURATION: 95 % | WEIGHT: 199.94 LBS | HEART RATE: 81 BPM | HEIGHT: 60 IN

## 2023-05-24 LAB
APPEARANCE UR: CLEAR
BACTERIA #/AREA URNS AUTO: NORMAL /HPF
BASOPHILS # BLD AUTO: 0.07 X10(3)/MCL
BASOPHILS NFR BLD AUTO: 0.8 %
BILIRUB UR QL STRIP.AUTO: NEGATIVE MG/DL
COLOR UR: YELLOW
EOSINOPHIL # BLD AUTO: 0.22 X10(3)/MCL (ref 0–0.9)
EOSINOPHIL NFR BLD AUTO: 2.7 %
ERYTHROCYTE [DISTWIDTH] IN BLOOD BY AUTOMATED COUNT: 15.2 % (ref 11.5–17)
GLUCOSE UR QL STRIP.AUTO: NEGATIVE MG/DL
HCT VFR BLD AUTO: 33.4 % (ref 37–47)
HGB BLD-MCNC: 10.2 G/DL (ref 12–16)
IMM GRANULOCYTES # BLD AUTO: 0.08 X10(3)/MCL (ref 0–0.04)
IMM GRANULOCYTES NFR BLD AUTO: 1 %
KETONES UR QL STRIP.AUTO: NEGATIVE MG/DL
LEUKOCYTE ESTERASE UR QL STRIP.AUTO: ABNORMAL UNIT/L
LYMPHOCYTES # BLD AUTO: 1.34 X10(3)/MCL (ref 0.6–4.6)
LYMPHOCYTES NFR BLD AUTO: 16.2 %
MCH RBC QN AUTO: 27.6 PG (ref 27–31)
MCHC RBC AUTO-ENTMCNC: 30.5 G/DL (ref 33–36)
MCV RBC AUTO: 90.3 FL (ref 80–94)
MONOCYTES # BLD AUTO: 1.15 X10(3)/MCL (ref 0.1–1.3)
MONOCYTES NFR BLD AUTO: 13.9 %
NEUTROPHILS # BLD AUTO: 5.4 X10(3)/MCL (ref 2.1–9.2)
NEUTROPHILS NFR BLD AUTO: 65.4 %
NITRITE UR QL STRIP.AUTO: NEGATIVE
NRBC BLD AUTO-RTO: 0 %
PH UR STRIP.AUTO: 5.5 [PH]
PLATELET # BLD AUTO: 376 X10(3)/MCL (ref 130–400)
PMV BLD AUTO: 9.3 FL (ref 7.4–10.4)
PROT UR QL STRIP.AUTO: NEGATIVE MG/DL
RBC # BLD AUTO: 3.7 X10(6)/MCL (ref 4.2–5.4)
RBC #/AREA URNS AUTO: <5 /HPF
RBC UR QL AUTO: NEGATIVE UNIT/L
SP GR UR STRIP.AUTO: 1.01 (ref 1–1.03)
SQUAMOUS #/AREA URNS AUTO: <5 /HPF
UROBILINOGEN UR STRIP-ACNC: 0.2 MG/DL
WBC # SPEC AUTO: 8.26 X10(3)/MCL (ref 4.5–11.5)
WBC #/AREA URNS AUTO: <5 /HPF

## 2023-05-24 PROCEDURE — 25000003 PHARM REV CODE 250: Performed by: INTERNAL MEDICINE

## 2023-05-24 PROCEDURE — 85025 COMPLETE CBC W/AUTO DIFF WBC: CPT | Performed by: NURSE PRACTITIONER

## 2023-05-24 PROCEDURE — 81001 URINALYSIS AUTO W/SCOPE: CPT | Performed by: PHYSICIAN ASSISTANT

## 2023-05-24 PROCEDURE — 63600175 PHARM REV CODE 636 W HCPCS: Performed by: INTERNAL MEDICINE

## 2023-05-24 PROCEDURE — 25000242 PHARM REV CODE 250 ALT 637 W/ HCPCS: Performed by: NURSE PRACTITIONER

## 2023-05-24 PROCEDURE — 25000003 PHARM REV CODE 250: Performed by: NURSE PRACTITIONER

## 2023-05-24 RX ORDER — FUROSEMIDE 20 MG/1
20 TABLET ORAL DAILY
Status: DISCONTINUED | OUTPATIENT
Start: 2023-05-24 | End: 2023-05-24 | Stop reason: HOSPADM

## 2023-05-24 RX ORDER — CARVEDILOL 3.12 MG/1
3.12 TABLET ORAL 2 TIMES DAILY
Qty: 60 TABLET | Refills: 11 | Status: SHIPPED | OUTPATIENT
Start: 2023-05-24 | End: 2024-05-23

## 2023-05-24 RX ORDER — FUROSEMIDE 20 MG/1
20 TABLET ORAL DAILY
Qty: 30 TABLET | Refills: 11 | Status: ON HOLD | OUTPATIENT
Start: 2023-05-24 | End: 2023-07-13 | Stop reason: HOSPADM

## 2023-05-24 RX ADMIN — SERTRALINE HYDROCHLORIDE 100 MG: 50 TABLET ORAL at 09:05

## 2023-05-24 RX ADMIN — ATORVASTATIN CALCIUM 10 MG: 10 TABLET, FILM COATED ORAL at 09:05

## 2023-05-24 RX ADMIN — FOLIC ACID 1 MG: 1 TABLET ORAL at 09:05

## 2023-05-24 RX ADMIN — FLUTICASONE FUROATE AND VILANTEROL TRIFENATATE 1 PUFF: 200; 25 POWDER RESPIRATORY (INHALATION) at 09:05

## 2023-05-24 RX ADMIN — FUROSEMIDE 20 MG: 20 TABLET ORAL at 11:05

## 2023-05-24 RX ADMIN — SODIUM CHLORIDE 125 MG: 9 INJECTION, SOLUTION INTRAVENOUS at 09:05

## 2023-05-24 RX ADMIN — GLIPIZIDE 5 MG: 5 TABLET, FILM COATED, EXTENDED RELEASE ORAL at 09:05

## 2023-05-24 RX ADMIN — CARVEDILOL 3.12 MG: 3.12 TABLET, FILM COATED ORAL at 09:05

## 2023-05-24 NOTE — PROGRESS NOTES
Discharge instructions given to patient. New medications and side effects were discussed. Follow-up appointments reviewed. IV and telemetry were discontinued. Pt will go home with [facility]. Pt was given dose of lasix before discharge. Pt was wheeled down via wheelchair and picked up via private vehicle.

## 2023-05-24 NOTE — PLAN OF CARE
05/24/23 1115   Final Note   Assessment Type Final Discharge Note   Anticipated Discharge Disposition Home-Health   Hospital Resources/Appts/Education Provided Post-Acute resouces added to AVS   Post-Acute Status   Post-Acute Authorization Home [facility]   Home Health Status Set-up Complete/Auth obtained  (Current with [facility])   Discharge Delays None known at this time

## 2023-05-24 NOTE — DISCHARGE SUMMARY
[facility] Medicine Discharge Summary    Admit Date: 5/22/2023  Discharge Date and Time: 5/24/202310:23 AM  Admitting Physician: ANIKET Team  Discharging Physician: Jorge Luis Gross MD.  Primary Care Physician: Jv Mead II, MD      Discharge Diagnoses:  Symptomatic anemia - resolved  Anemia of chronic disease  Hx of COPD  ALECIA  Hypertension  DM Type II       Hospital Course:   The patient is an 87 year old CF with a PMHX of asthma, COPD, Diabetes -Type II, hypertension, hyperlipidemia, and cataracts. She reports being more SOB most recently, however she presented with swelling in BLEs and was instructed to report to the ED due to a low blood count. She reports left hip and LLE pain for which she received 2 ESIs over the past 2 months which have greatly improved her lower back and LLE pain. The patient was seen and evaluated in the ED. Denies any fever, chills, night sweats, mucopurulent sputum, or hemoptysis. Denies vomiting blood and passing blood in the stool. Follows with Dr. Rodo Padilla, for asthma/COPD. Stool was negative for occult blood.  Labs done and showed Hb 7.6. she was transfused 2 units of PRBC and H&H stabilized. She had no GI blood loss. She was stable and asymptomatic. ECHO done and showed   Summary    Moderate concentric hypertrophy and normal systolic function.  The estimated ejection fraction is 55%.  Grade II left ventricular diastolic dysfunction.  Normal right ventricular size with normal right ventricular systolic function.  There is mild aortic valve stenosis.  Aortic valve area is 1.25 cm2; peak velocity is 2.73 m/s; mean gradient is 16 mmHg.  Mild tricuspid regurgitation.  Normal central venous pressure (3 mmHg).  The estimated PA systolic pressure is 28 mmHg.  There is no pulmonary hypertension.      US kidneys done    Bilateral mildly small kidneys with some loss of kidneys cortical volume without loss of corticomedullary  differentiation.    Patient was started on coreg 3.125 mg BID and lasix 20 mg daily. Advised to avoid NSAIDs. She was set up with a close f/u with her PCP and discharged home in a stable condition.     Pt was seen and examined on the day of discharge  Vitals:  VITAL SIGNS: 24 HRS MIN & MAX LAST   Temp  Min: 98.2 °F (36.8 °C)  Max: 99 °F (37.2 °C) 98.9 °F (37.2 °C)   BP  Min: 141/65  Max: 160/70 (!) 141/65   Pulse  Min: 66  Max: 86  81   Resp  Min: 18  Max: 18 18   SpO2  Min: 93 %  Max: 99 % 95 %       Physical Exam:  Heart RRR  Lungs clear   Abdomen soft and non tender   Neuro: No FND      Procedures Performed: No admission procedures for hospital encounter.     Significant Diagnostic Studies: See Full reports for all details    Recent Labs   Lab 05/22/23  1131 05/23/23  0907 05/24/23  0510   WBC 7.03  --  8.26   RBC 2.84*  --  3.70*   HGB 7.6* 10.1* 10.2*   HCT 26.0* 31.7* 33.4*   MCV 91.5  --  90.3   MCH 26.8*  --  27.6   MCHC 29.2*  --  30.5*   RDW 15.5  --  15.2     --  376   MPV 9.4  --  9.3       Recent Labs   Lab 05/22/23  1130      K 4.4   CO2 19*   BUN 26.3*   CREATININE 1.94*   CALCIUM 9.4   ALBUMIN 3.6   ALKPHOS 87   ALT 9   AST 10   BILITOT 0.5        Microbiology Results (last 7 days)       ** No results found for the last 168 hours. **             US Retroperitoneal Complete  Narrative: EXAMINATION:  US RETROPERITONEAL COMPLETE    CLINICAL HISTORY:  ALECIA.    TECHNIQUE:  Multiple real-time images were performed of the kidneys in various planes by the sonographer.    COMPARISON:  None available.    FINDINGS:  Right kidney measures 8.3 x 4.5 x 5.2 cm and the left kidney measures 8.5 x 4.1 x 4.1 cm.  There is less than optimal visualization of the kidneys due to overlying bowel gas.  There appears bilateral some thinning of the kidneys cortex without significant loss of the corticomedullary differentiation. There are no renal shadowing echogenic foci to reflect calculi.  Bilateral kidneys  collecting systems are without dilatation  Impression: Bilateral mildly small kidneys with some loss of kidneys cortical volume without loss of corticomedullary differentiation.    Electronically signed by: Solo Whitten  Date:    05/23/2023  Time:    21:28  Echo  · Moderate concentric hypertrophy and normal systolic function.  · The estimated ejection fraction is 55%.  · Grade II left ventricular diastolic dysfunction.  · Normal right ventricular size with normal right ventricular systolic   function.  · There is mild aortic valve stenosis.  · Aortic valve area is 1.25 cm2; peak velocity is 2.73 m/s; mean gradient   is 16 mmHg.  · Mild tricuspid regurgitation.  · Normal central venous pressure (3 mmHg).  · The estimated PA systolic pressure is 28 mmHg.  · There is no pulmonary hypertension.     X-Ray Chest 1 View  Narrative: EXAMINATION:  XR CHEST 1 VIEW    CLINICAL HISTORY:  chf;    TECHNIQUE:  Single view of the chest    COMPARISON:  No prior imaging available for comparison.    FINDINGS:  No focal opacification, pleural effusion, or pneumothorax.    Left cardiomediastinal silhouette remains prominent.    No acute osseous abnormality.  Impression: No acute cardiopulmonary process.    Electronically signed by: Vaughn Shaw  Date:    05/23/2023  Time:    07:23         Medication List        START taking these medications      carvediloL 3.125 MG tablet  Commonly known as: COREG  Take 1 tablet (3.125 mg total) by mouth 2 (two) times daily.     furosemide 20 MG tablet  Commonly known as: LASIX  Take 1 tablet (20 mg total) by mouth once daily.            CONTINUE taking these medications      ADVAIR DISKUS 250-50 mcg/dose diskus inhaler  Generic drug: fluticasone-salmeterol 250-50 mcg/dose     albuterol 90 mcg/actuation inhaler  Commonly known as: PROVENTIL/VENTOLIN HFA     ALPRAZolam 1 MG tablet  Commonly known as: XANAX     amLODIPine 5 MG tablet  Commonly known as: NORVASC     atorvastatin 10 MG tablet  Commonly  known as: LIPITOR     chlorthalidone 25 MG Tab  Commonly known as: HYGROTEN     gabapentin 300 MG capsule  Commonly known as: NEURONTIN     glipiZIDE 10 MG Tr24  Commonly known as: GLUCOTROL     losartan 100 MG tablet  Commonly known as: COZAAR     pantoprazole 40 MG tablet  Commonly known as: PROTONIX     polyethylene glycol 17 gram Pwpk  Commonly known as: GLYCOLAX     potassium chloride SA 10 MEQ tablet  Commonly known as: K-DUR,KLOR-CON M     sertraline 100 MG tablet  Commonly known as: ZOLOFT               Where to Get Your Medications        [facility] AT Portal to Registered [facility] [address]      Phone: [phone]   carvediloL 3.125 MG tablet  furosemide 20 MG tablet          Explained in detail to the patient about the discharge plan, medications, and follow-up visits. Pt understands and agrees with the treatment plan  Discharge Disposition: Home or Self Care   Discharged Condition: stable  Diet-   Dietary Orders (From admission, onward)       Start     Ordered    05/23/23 0248  Diet diabetic  (Diet/Nutrition [facility])  Diet effective now         05/23/23 0258                   Medications Per DC med rec  Activities as tolerated   Contact information for follow-up providers       Jv Mead II, MD Follow up in 2 week(s).    Specialty: Family Medicine  Why: with CBC before visit  Contact information:  [address] [facility] [address] [phone]                       Contact information for after-discharge care       Home Medical Care       [facility] .    Service: Home Health Services  Contact information:  [address] [address] [phone]                                 For further questions contact hospitalist office    Discharge time 33 minutes    For worsening symptoms, chest pain, shortness of  breath, increased abdominal pain, high grade fever, stroke or stroke like symptoms, immediately go to the nearest Emergency Room or call 911 as soon as possible.      Jorge Luis Keith M.D on 5/24/2023. at 10:23 AM.

## 2023-05-25 ENCOUNTER — PATIENT OUTREACH (OUTPATIENT)
Dept: ADMINISTRATIVE | Facility: CLINIC | Age: 88
End: 2023-05-25
Payer: MEDICARE

## 2023-05-25 NOTE — PROGRESS NOTES
C3 nurse spoke with Salma Mitchell  for a TCC post hospital discharge follow up call. The patient has a scheduled HOSFU appointment with Jv Mead II, MD  on 06/01/2023. Appointment with Dr. Turcios on 06/07/2023.     Patient taking;  Cipro 500 mg 1 daily for 10 days

## 2023-05-25 NOTE — PHYSICIAN QUERY
PT Name: Salma Mitchell  MR #: [id number]     DOCUMENTATION CLARIFICATION     CDS/: Sharmin Young RN, BSN               Contact information: [email or web address]   This form is a permanent document in the medical record.    Query Date: May 25, 2023    By submitting this query, we are merely seeking further clarification of documentation.  Please utilize your independent clinical judgment when addressing the question(s) below.    The Medical Record contains the following:   Indicator Supporting Clinical Findings Location in Medical Record    Kidney (Renal) Insufficiency     x Kidney (Renal) Failure/Injury ALECIA   H&P, 5/23   x Nephrotoxic Agents received 2 ESIs over the past 2 months   H&P, 5/23   x BUN/Creatinine           GFR  05/22/23 11:30   Creatinine 1.94 (H)      05/22/23 11:30   BUN 26.3 (H)      05/22/23 11:30   eGFR 25    LAB                                Urine: Casts         Eosinophils     x Urine Output 3 Urine Occurrences   2 Urine Occurrences   4 Urine Occurrences    RN Flowsheets, 5/24  RN Flowsheets, 5/23  RN Flowsheets, 5/23    Dehydration      Nausea/Vomiting      Dialysis/CRRT     x Treatment a couple of years ago she was seen by a nurse associated with Dr. Faizan Skelton with renal.  Renal consultation with Dr. Faizan Skelton    Received 2 units PRBC & Lasix 40 mg x 1  Obtain 2-d echo  Resume Advair and Proventil prn   H&P, 5/23     x Other FINDINGS:  Right kidney measures 8.3 x 4.5 x 5.2 cm and the left kidney measures 8.5 x 4.1 x 4.1 cm.  There is less than optimal visualization of the kidneys due to overlying bowel gas.  There appears bilateral some thinning of the kidneys cortex without significant loss of the corticomedullary differentiation. There are no renal shadowing echogenic foci to reflect calculi.  Bilateral kidneys collecting systems are without dilatation     Impression:   Bilateral mildly small kidneys with some loss of kidneys cortical volume without loss of  corticomedullary differentiation.   US Retroperitoneal, 5/23       [facility] approved diagnostic criteria for acute kidney injury is based on KDIGO criteria:    An increase in serum creatinine > 0.3mg/dl within 48 hours  OR  Increase in serum creatinine to > 1.5x baseline, which is known or presumed to have occurred within the prior 7 days  OR  Urine volume <0.5 ml/kg/hr for 6 hours       The clinical guidelines noted above are only a system guideline. It does not replace the providers clinical judgment.     Due to the conflicting clinical picture, please clinically validate the diagnosis of ALECIA.  If validated, please provide additional clinical support for the diagnosis.      [  x  ] Acute Kidney Failure/Injury is not confirmed or Ruled Out and Acute Renal Insufficiency is Ruled In     - Consider if SCr rise is transient and normalizes quickly with no efforts at real resuscitation of vital signs and perfusion     [   ] Unspecified Acute Kidney Failure/Injury is not confirmed or Ruled Out     [    ] Unspecified Acute Kidney Failure/Injury diagnosis is confirmed. Please specify clinical support (signs, symptoms, and treatment) for  the confirmed diagnosis: ____________________     [    ] Acute Kidney Failure/Injury diagnosis is not confirmed and/or it has been ruled out, and Chronic Kidney Disease (CKD) stage 4 - Severely decreased eGFR 15-29 is ruled in (please specify clinical support for the diagnosis with signs, symptoms and treatment): _______________     [    ] Unspecified Acute Kidney Failure/Injury is not confirmed or Ruled Out, Other Diagnosis Ruled in (please specify): ______________     [    ] Acute Kidney Failure/Injury diagnosis is confirmed and is superimposed on Chronic Kidney Disease (CKD) (please specifyclinical support for the diagnosis with signs, symptoms and treatment): _____________     [    ] Other (please specify): _______________________________             Please document in your  progress notes daily for the duration of treatment until resolved and include in your discharge summary.    References:   [employer] Clinical Practice Guideline for Acute Kidney Injury. (2012, March). Retrieved October 21, 2020, from https://kdigo.org/wp-content/uploads/2016/10/CUDAS-1347-ASP-Guideline-English.pdf    RUBY Russell MD, DAISY Huerta MD, & SHY Tijerina MD. (1960). Renal medullary necrosis [Abstract]. The American Journal of Medicine, 29(1), 132-156. Doi:https://www.sciencedirect.com/science/article/abs/pii/7278330501367158    SHY Valdez MD, & PHIL Zamora MD, MS. (2020, June 18). Definition and staging of chronic kidney disease in adults ([id number] DAISY Joiner MD, ScD & [id number] TEGAN Castro MD, MSc, Eds.). Retrieved October 21, 2020, from https://www.[employer]/contents/definition-and-staging-of-chronic-kidney-disease-in-adults?search=ckd%20staging&source=search_result&selectedTitle=1~150&usage_type=default&display_rank=1     MADDY Coyne MD, FACP. (2015, Vivien 15). Acute kidney injury revisited. Retrieved October 21, 2020, from https://acphospitalist.org/archives/2015/06/coding-acute-kidney-injury.htm    ANTOINE Murphy MD. (2019, July). Renal Cortical Necrosis. Retrieved October 21, 2020, from https://www.[employer]/professional/genitourinary-disorders/renovascular-disorders/renal-cortical-necrosis    Form No. 95195

## 2023-05-25 NOTE — PHYSICIAN QUERY
PT Name: Salma Mitchell  MR #: [id number]     DOCUMENTATION CLARIFICATION     CDS/: Sharmin Young RN, BSN               Contact information: [email or web address]   This form is a permanent document in the medical record.     Query Date: May 25, 2023    By submitting this query, we are merely seeking further clarification of documentation.  Please utilize your independent clinical judgment when addressing the question(s) below.    The Medical Record contains the following   Indicators Supporting Clinical Findings Location in Medical Record    Heart Failure documented      BNP     x EF/Echo  echocardiogram with preserved ejection fraction /diastolic dysfunction stage III.    Moderate concentric hypertrophy and normal systolic function.  The estimated ejection fraction is 55%.  Grade II left ventricular diastolic dysfunction.  Normal right ventricular size with normal right ventricular systolic function.  There is mild aortic valve stenosis.  Aortic valve area is 1.25 cm2; peak velocity is 2.73 m/s; mean gradient is 16 mmHg.  Mild tricuspid regurgitation.  Normal central venous pressure (3 mmHg).  The estimated PA systolic pressure is 28 mmHg.  There is no pulmonary hypertension.   H&P, 5/23    Echo, 5/23    Radiology findings     x Subjective/Objective Respiratory Conditions having sob and lower leg swelling for a few weeks  SOB is chronic, and she also complains of orthopnea and weight gain   ED, 5/22    Recent/Current MI      Heart Transplant, LVAD     x Edema, JVD Edema (2+ pitting bilateral LE)     BLEs with 3+ pitting edema   ED, 5/22    H&P, 5/23    Ascites     x Diuretics/Meds furosemide injection 40 mg     labetaloL injection 10 mg       carvediloL tablet 3.125 mg       furosemide tablet 20 mg      MAR, 5/22      MAR, 5/23    MAR, 5/24   x Other Treatment Received 2 units PRBC & Lasix 40 mg x 1  Obtain 2-d echo  Resume Advair and Proventil prn    DC Norvasc start patient on Coreg 3.125 mg p.o.  b.i.d.  Continue ARB  Retroperitoneal ultrasound   H&P, 5/23    Other       Heart failure is a clinical diagnosis which includes symptomatic fluid retention, elevated intracardiac pressures, and/or the inability of the heart to deliver adequate blood flow.    Heart Failure with reduced Ejection Fraction (HFrEF) or Systolic Heart Failure (loses ability to contract normally, EF is <40%)    Heart Failure with preserved Ejection Fraction (HFpEF) or Diastolic Heart Failure (stiff ventricles, does not relax properly, EF is >50%)     Heart Failure with Combined Systolic and Diastolic Failure (stiff ventricles, does not relax properly and EF is <50%)    Mid-range or mildly reduced ejection fraction (HFmrEF) is classified as systolic heart failure.  Congestive heart failure with a recovered EF is classified as Diastolic Heart Failure.  Common clues to acute exacerbation:  Rapidly progressive symptoms (w/in 2 weeks of presentation), using IV diuretics, using supplemental O2, pulmonary edema on Xray, new or worsening pleural effusion, +JVD or other signs of volume overload, MI w/in 4 weeks, and/or BNP >500  The clinical guidelines noted are only system guidelines, and do not replace the providers clinical judgment.    Provider, please specify the diagnosis associated with the above clinical findings.    [  x ]  Acute Diastolic Heart Failure (HFpEF) - new diagnosis     [   ]  Acute on Chronic Diastolic Heart Failure (HFpEF) - worsening of CHF signs/symptoms in preexisting CHF     [   ]  Chronic Diastolic Heart Failure (HFpEF) - preexisting and stable     [   ]  Other (please specify): ___________________________________     [  ]  Clinically Undetermined         Please document in your progress notes daily for the duration of treatment until resolved and include in your discharge summary.    References:  American Heart Association editorial staff. (2017, May). Ejection Fraction Heart Failure Measurement. American Heart  Association. https://www.heart.org/en/health-topics/heart-failure/diagnosing-heart-failure/ejection-fraction-heart-failure-measurement#:~:text=Ejection%20fraction%20(EF)%20is%20a,pushed%20out%20with%20each%20heartbeat  MARY Bloom (2020, December 15). Heart failure with preserved ejection fraction: Clinical manifestations and diagnosis. [employer]. https://www.[employer].youwho/contents/heart-failure-with-preserved-ejection-fraction-clinical-manifestations-and-diagnosis.  ICD-10-CM/PCS Coding Clinic Third Quarter ICD-10, Effective with discharges: September 8, 2020 [facility] § Heart failure with mid-range or mildly reduced ejection fraction (2020).  ICD-10-CM/PCS Coding Clinic Third Quarter ICD-10, Effective with discharges: September 8, 2020 [facility] § Heart failure with recovered ejection fraction (2020).  Form No. [id number]

## 2023-07-09 ENCOUNTER — HOSPITAL ENCOUNTER (INPATIENT)
Facility: HOSPITAL | Age: 88
LOS: 3 days | Discharge: NURSING, REHAB OR LONG-TERM CARE | DRG: 511 | End: 2023-07-13
Attending: EMERGENCY MEDICINE | Admitting: STUDENT IN AN ORGANIZED HEALTH CARE EDUCATION/TRAINING PROGRAM
Payer: MEDICARE
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
## 2023-07-09 DIAGNOSIS — I10 BENIGN ESSENTIAL HYPERTENSION: ICD-10-CM

## 2023-07-09 DIAGNOSIS — S63.054A CLOSED DISLOCATION OF CARPOMETACARPAL JOINT OF RIGHT WRIST, INITIAL ENCOUNTER: ICD-10-CM

## 2023-07-09 DIAGNOSIS — R52 PAIN: ICD-10-CM

## 2023-07-09 DIAGNOSIS — S52.591A OTHER CLOSED FRACTURE OF DISTAL END OF RIGHT RADIUS, INITIAL ENCOUNTER: ICD-10-CM

## 2023-07-09 DIAGNOSIS — R60.9 SWELLING: ICD-10-CM

## 2023-07-09 PROCEDURE — 99285 EMERGENCY DEPT VISIT HI MDM: CPT | Mod: 25

## 2023-07-09 PROCEDURE — 99152 MOD SED SAME PHYS/QHP 5/>YRS: CPT

## 2023-07-09 PROCEDURE — 99153 MOD SED SAME PHYS/QHP EA: CPT

## 2023-07-09 PROCEDURE — 25605 CLTX DST RDL FX/EPHYS SEP W/: CPT | Mod: RT

## 2023-07-09 PROCEDURE — 96375 TX/PRO/DX INJ NEW DRUG ADDON: CPT

## 2023-07-09 PROCEDURE — 96374 THER/PROPH/DIAG INJ IV PUSH: CPT

## 2023-07-09 RX ORDER — FENTANYL CITRATE 50 UG/ML
INJECTION, SOLUTION INTRAMUSCULAR; INTRAVENOUS
Status: COMPLETED
Start: 2023-07-09 | End: 2023-07-10

## 2023-07-10 ENCOUNTER — ANESTHESIA EVENT (OUTPATIENT)
Dept: SURGERY | Facility: HOSPITAL | Age: 88
DRG: 511 | End: 2023-07-10
Payer: MEDICARE

## 2023-07-10 ENCOUNTER — ANESTHESIA (OUTPATIENT)
Dept: SURGERY | Facility: HOSPITAL | Age: 88
DRG: 511 | End: 2023-07-10
Payer: MEDICARE

## 2023-07-10 PROBLEM — S52.501A CLOSED FRACTURE OF RIGHT DISTAL RADIUS: Status: ACTIVE | Noted: 2023-07-10

## 2023-07-10 LAB
ALBUMIN SERPL-MCNC: 3.9 G/DL (ref 3.4–4.8)
ALBUMIN/GLOB SERPL: 1.6 RATIO (ref 1.1–2)
ALP SERPL-CCNC: 114 UNIT/L (ref 40–150)
ALT SERPL-CCNC: 10 UNIT/L (ref 0–55)
APTT PPP: 29.8 SECONDS (ref 23.2–33.7)
AST SERPL-CCNC: 13 UNIT/L (ref 5–34)
BASOPHILS # BLD AUTO: 0.05 X10(3)/MCL
BASOPHILS NFR BLD AUTO: 0.4 %
BILIRUBIN DIRECT+TOT PNL SERPL-MCNC: 0.5 MG/DL
BUN SERPL-MCNC: 49.1 MG/DL (ref 9.8–20.1)
CALCIUM SERPL-MCNC: 9.4 MG/DL (ref 8.4–10.2)
CHLORIDE SERPL-SCNC: 101 MMOL/L (ref 98–107)
CO2 SERPL-SCNC: 24 MMOL/L (ref 23–31)
CREAT SERPL-MCNC: 2.47 MG/DL (ref 0.55–1.02)
EOSINOPHIL # BLD AUTO: 0.25 X10(3)/MCL (ref 0–0.9)
EOSINOPHIL NFR BLD AUTO: 2.2 %
ERYTHROCYTE [DISTWIDTH] IN BLOOD BY AUTOMATED COUNT: 16.5 % (ref 11.5–17)
GFR SERPLBLD CREATININE-BSD FMLA CKD-EPI: 19 MLS/MIN/1.73/M2
GLOBULIN SER-MCNC: 2.4 GM/DL (ref 2.4–3.5)
GLUCOSE SERPL-MCNC: 220 MG/DL (ref 82–115)
HCT VFR BLD AUTO: 28.8 % (ref 37–47)
HGB BLD-MCNC: 9.4 G/DL (ref 12–16)
IMM GRANULOCYTES # BLD AUTO: 0.13 X10(3)/MCL (ref 0–0.04)
IMM GRANULOCYTES NFR BLD AUTO: 1.2 %
INR BLD: 1.06 (ref 0–1.3)
LYMPHOCYTES # BLD AUTO: 1.44 X10(3)/MCL (ref 0.6–4.6)
LYMPHOCYTES NFR BLD AUTO: 12.9 %
MCH RBC QN AUTO: 28.7 PG (ref 27–31)
MCHC RBC AUTO-ENTMCNC: 32.6 G/DL (ref 33–36)
MCV RBC AUTO: 88.1 FL (ref 80–94)
MONOCYTES # BLD AUTO: 0.79 X10(3)/MCL (ref 0.1–1.3)
MONOCYTES NFR BLD AUTO: 7.1 %
NEUTROPHILS # BLD AUTO: 8.5 X10(3)/MCL (ref 2.1–9.2)
NEUTROPHILS NFR BLD AUTO: 76.2 %
NRBC BLD AUTO-RTO: 0 %
PLATELET # BLD AUTO: 244 X10(3)/MCL (ref 130–400)
PMV BLD AUTO: 10.7 FL (ref 7.4–10.4)
POCT GLUCOSE: 186 MG/DL (ref 70–110)
POCT GLUCOSE: 195 MG/DL (ref 70–110)
POTASSIUM SERPL-SCNC: 4.2 MMOL/L (ref 3.5–5.1)
PROT SERPL-MCNC: 6.3 GM/DL (ref 5.8–7.6)
PROTHROMBIN TIME: 13.7 SECONDS (ref 12.5–14.5)
RBC # BLD AUTO: 3.27 X10(6)/MCL (ref 4.2–5.4)
SODIUM SERPL-SCNC: 137 MMOL/L (ref 136–145)
WBC # SPEC AUTO: 11.16 X10(3)/MCL (ref 4.5–11.5)

## 2023-07-10 PROCEDURE — 71000016 HC POSTOP RECOV ADDL HR: Performed by: ORTHOPAEDIC SURGERY

## 2023-07-10 PROCEDURE — 25609 PR OPEN RX DISTAL RADIUS FX, INTRA-ARTICULAR, 3+ FRAG: ICD-10-PCS | Mod: RT,,, | Performed by: ORTHOPAEDIC SURGERY

## 2023-07-10 PROCEDURE — 21400001 HC TELEMETRY ROOM

## 2023-07-10 PROCEDURE — 76937 US GUIDE VASCULAR ACCESS: CPT

## 2023-07-10 PROCEDURE — 63600175 PHARM REV CODE 636 W HCPCS: Performed by: ANESTHESIOLOGY

## 2023-07-10 PROCEDURE — 25609 OPTX DST RD XART FX/EP SEP3+: CPT | Mod: RT,,, | Performed by: ORTHOPAEDIC SURGERY

## 2023-07-10 PROCEDURE — 85610 PROTHROMBIN TIME: CPT | Performed by: EMERGENCY MEDICINE

## 2023-07-10 PROCEDURE — 71000015 HC POSTOP RECOV 1ST HR: Performed by: ORTHOPAEDIC SURGERY

## 2023-07-10 PROCEDURE — 80053 COMPREHEN METABOLIC PANEL: CPT | Performed by: EMERGENCY MEDICINE

## 2023-07-10 PROCEDURE — 27201423 OPTIME MED/SURG SUP & DEVICES STERILE SUPPLY: Performed by: ORTHOPAEDIC SURGERY

## 2023-07-10 PROCEDURE — 25609 PR OPEN RX DISTAL RADIUS FX, INTRA-ARTICULAR, 3+ FRAG: ICD-10-PCS | Mod: AS,RT,, | Performed by: NURSE PRACTITIONER

## 2023-07-10 PROCEDURE — 36000710: Performed by: ORTHOPAEDIC SURGERY

## 2023-07-10 PROCEDURE — 99223 PR INITIAL HOSPITAL CARE,LEVL III: ICD-10-PCS | Mod: 57,,, | Performed by: NURSE PRACTITIONER

## 2023-07-10 PROCEDURE — D9220A PRA ANESTHESIA: ICD-10-PCS | Mod: ANES,,, | Performed by: ANESTHESIOLOGY

## 2023-07-10 PROCEDURE — 37000009 HC ANESTHESIA EA ADD 15 MINS: Performed by: ORTHOPAEDIC SURGERY

## 2023-07-10 PROCEDURE — 36000711: Performed by: ORTHOPAEDIC SURGERY

## 2023-07-10 PROCEDURE — 36410 VNPNXR 3YR/> PHY/QHP DX/THER: CPT

## 2023-07-10 PROCEDURE — 63600175 PHARM REV CODE 636 W HCPCS: Performed by: ORTHOPAEDIC SURGERY

## 2023-07-10 PROCEDURE — 63600175 PHARM REV CODE 636 W HCPCS

## 2023-07-10 PROCEDURE — 63600175 PHARM REV CODE 636 W HCPCS: Performed by: NURSE PRACTITIONER

## 2023-07-10 PROCEDURE — 96372 THER/PROPH/DIAG INJ SC/IM: CPT | Performed by: STUDENT IN AN ORGANIZED HEALTH CARE EDUCATION/TRAINING PROGRAM

## 2023-07-10 PROCEDURE — C1713 ANCHOR/SCREW BN/BN,TIS/BN: HCPCS | Performed by: ORTHOPAEDIC SURGERY

## 2023-07-10 PROCEDURE — 90471 IMMUNIZATION ADMIN: CPT | Performed by: EMERGENCY MEDICINE

## 2023-07-10 PROCEDURE — 90715 TDAP VACCINE 7 YRS/> IM: CPT | Performed by: EMERGENCY MEDICINE

## 2023-07-10 PROCEDURE — 63600175 PHARM REV CODE 636 W HCPCS: Performed by: STUDENT IN AN ORGANIZED HEALTH CARE EDUCATION/TRAINING PROGRAM

## 2023-07-10 PROCEDURE — 99223 1ST HOSP IP/OBS HIGH 75: CPT | Mod: 57,,, | Performed by: NURSE PRACTITIONER

## 2023-07-10 PROCEDURE — D9220A PRA ANESTHESIA: Mod: ANES,,, | Performed by: ANESTHESIOLOGY

## 2023-07-10 PROCEDURE — 63600175 PHARM REV CODE 636 W HCPCS: Performed by: NURSE ANESTHETIST, CERTIFIED REGISTERED

## 2023-07-10 PROCEDURE — 37000008 HC ANESTHESIA 1ST 15 MINUTES: Performed by: ORTHOPAEDIC SURGERY

## 2023-07-10 PROCEDURE — 85730 THROMBOPLASTIN TIME PARTIAL: CPT | Performed by: EMERGENCY MEDICINE

## 2023-07-10 PROCEDURE — 63600175 PHARM REV CODE 636 W HCPCS: Performed by: EMERGENCY MEDICINE

## 2023-07-10 PROCEDURE — D9220A PRA ANESTHESIA: Mod: CRNA,,, | Performed by: NURSE ANESTHETIST, CERTIFIED REGISTERED

## 2023-07-10 PROCEDURE — 25000003 PHARM REV CODE 250: Performed by: STUDENT IN AN ORGANIZED HEALTH CARE EDUCATION/TRAINING PROGRAM

## 2023-07-10 PROCEDURE — 25000003 PHARM REV CODE 250: Performed by: ANESTHESIOLOGY

## 2023-07-10 PROCEDURE — C1751 CATH, INF, PER/CENT/MIDLINE: HCPCS

## 2023-07-10 PROCEDURE — 11000001 HC ACUTE MED/SURG PRIVATE ROOM

## 2023-07-10 PROCEDURE — 25609 OPTX DST RD XART FX/EP SEP3+: CPT | Mod: AS,RT,, | Performed by: NURSE PRACTITIONER

## 2023-07-10 PROCEDURE — A4216 STERILE WATER/SALINE, 10 ML: HCPCS | Performed by: STUDENT IN AN ORGANIZED HEALTH CARE EDUCATION/TRAINING PROGRAM

## 2023-07-10 PROCEDURE — 71000033 HC RECOVERY, INTIAL HOUR: Performed by: ORTHOPAEDIC SURGERY

## 2023-07-10 PROCEDURE — 71000039 HC RECOVERY, EACH ADD'L HOUR: Performed by: ORTHOPAEDIC SURGERY

## 2023-07-10 PROCEDURE — 25000003 PHARM REV CODE 250

## 2023-07-10 PROCEDURE — C1769 GUIDE WIRE: HCPCS | Performed by: ORTHOPAEDIC SURGERY

## 2023-07-10 PROCEDURE — D9220A PRA ANESTHESIA: ICD-10-PCS | Mod: CRNA,,, | Performed by: NURSE ANESTHETIST, CERTIFIED REGISTERED

## 2023-07-10 PROCEDURE — 25000003 PHARM REV CODE 250: Performed by: EMERGENCY MEDICINE

## 2023-07-10 PROCEDURE — 25000003 PHARM REV CODE 250: Performed by: NURSE ANESTHETIST, CERTIFIED REGISTERED

## 2023-07-10 PROCEDURE — 85025 COMPLETE CBC W/AUTO DIFF WBC: CPT | Performed by: EMERGENCY MEDICINE

## 2023-07-10 DEVICE — SCREW ALPS LP N LOK 2.7X18MM: Type: IMPLANTABLE DEVICE | Site: WRIST | Status: FUNCTIONAL

## 2023-07-10 DEVICE — SCREW ALPS LP N LOK 2.7X16MM: Type: IMPLANTABLE DEVICE | Site: WRIST | Status: FUNCTIONAL

## 2023-07-10 DEVICE — IMPLANTABLE DEVICE: Type: IMPLANTABLE DEVICE | Site: WRIST | Status: FUNCTIONAL

## 2023-07-10 DEVICE — SCREW BONE CORTICAL 2.7X14MM: Type: IMPLANTABLE DEVICE | Site: WRIST | Status: FUNCTIONAL

## 2023-07-10 DEVICE — SCREW BONE CORTICAL 2.7X16MM: Type: IMPLANTABLE DEVICE | Site: WRIST | Status: FUNCTIONAL

## 2023-07-10 RX ORDER — LIDOCAINE HYDROCHLORIDE 10 MG/ML
1 INJECTION, SOLUTION EPIDURAL; INFILTRATION; INTRACAUDAL; PERINEURAL ONCE AS NEEDED
Status: DISCONTINUED | OUTPATIENT
Start: 2023-07-10 | End: 2023-07-13 | Stop reason: HOSPADM

## 2023-07-10 RX ORDER — PROCHLORPERAZINE EDISYLATE 5 MG/ML
5 INJECTION INTRAMUSCULAR; INTRAVENOUS EVERY 30 MIN PRN
Status: DISCONTINUED | OUTPATIENT
Start: 2023-07-10 | End: 2023-07-10 | Stop reason: HOSPADM

## 2023-07-10 RX ORDER — TALC
6 POWDER (GRAM) TOPICAL NIGHTLY PRN
Status: DISCONTINUED | OUTPATIENT
Start: 2023-07-10 | End: 2023-07-13 | Stop reason: HOSPADM

## 2023-07-10 RX ORDER — GLUCAGON 1 MG
1 KIT INJECTION
Status: DISCONTINUED | OUTPATIENT
Start: 2023-07-10 | End: 2023-07-13 | Stop reason: HOSPADM

## 2023-07-10 RX ORDER — FUROSEMIDE 20 MG/1
20 TABLET ORAL DAILY
Status: DISCONTINUED | OUTPATIENT
Start: 2023-07-10 | End: 2023-07-11

## 2023-07-10 RX ORDER — CEFAZOLIN SODIUM 2 G/50ML
2 SOLUTION INTRAVENOUS
Status: COMPLETED | OUTPATIENT
Start: 2023-07-10 | End: 2023-07-11

## 2023-07-10 RX ORDER — CHLORTHALIDONE 25 MG/1
25 TABLET ORAL DAILY
Status: DISCONTINUED | OUTPATIENT
Start: 2023-07-10 | End: 2023-07-11

## 2023-07-10 RX ORDER — ONDANSETRON 2 MG/ML
INJECTION INTRAMUSCULAR; INTRAVENOUS
Status: DISCONTINUED | OUTPATIENT
Start: 2023-07-10 | End: 2023-07-10

## 2023-07-10 RX ORDER — FENTANYL CITRATE 50 UG/ML
INJECTION, SOLUTION INTRAMUSCULAR; INTRAVENOUS
Status: DISCONTINUED | OUTPATIENT
Start: 2023-07-10 | End: 2023-07-10

## 2023-07-10 RX ORDER — METHOCARBAMOL 100 MG/ML
1000 INJECTION, SOLUTION INTRAMUSCULAR; INTRAVENOUS ONCE
Status: COMPLETED | OUTPATIENT
Start: 2023-07-10 | End: 2023-07-10

## 2023-07-10 RX ORDER — ROCURONIUM BROMIDE 10 MG/ML
INJECTION, SOLUTION INTRAVENOUS
Status: DISCONTINUED | OUTPATIENT
Start: 2023-07-10 | End: 2023-07-10

## 2023-07-10 RX ORDER — POLYETHYLENE GLYCOL 3350 17 G/17G
17 POWDER, FOR SOLUTION ORAL DAILY
Status: DISCONTINUED | OUTPATIENT
Start: 2023-07-10 | End: 2023-07-13 | Stop reason: HOSPADM

## 2023-07-10 RX ORDER — PROPOFOL 10 MG/ML
VIAL (ML) INTRAVENOUS
Status: DISCONTINUED | OUTPATIENT
Start: 2023-07-10 | End: 2023-07-10

## 2023-07-10 RX ORDER — MORPHINE SULFATE 4 MG/ML
2 INJECTION, SOLUTION INTRAMUSCULAR; INTRAVENOUS EVERY 4 HOURS PRN
Status: DISCONTINUED | OUTPATIENT
Start: 2023-07-10 | End: 2023-07-13

## 2023-07-10 RX ORDER — SERTRALINE HYDROCHLORIDE 50 MG/1
100 TABLET, FILM COATED ORAL DAILY
Status: DISCONTINUED | OUTPATIENT
Start: 2023-07-10 | End: 2023-07-13 | Stop reason: HOSPADM

## 2023-07-10 RX ORDER — SODIUM CHLORIDE 0.9 % (FLUSH) 0.9 %
10 SYRINGE (ML) INJECTION
Status: DISCONTINUED | OUTPATIENT
Start: 2023-07-10 | End: 2023-07-11

## 2023-07-10 RX ORDER — HYDROMORPHONE HYDROCHLORIDE 2 MG/ML
INJECTION, SOLUTION INTRAMUSCULAR; INTRAVENOUS; SUBCUTANEOUS
Status: COMPLETED
Start: 2023-07-10 | End: 2023-07-10

## 2023-07-10 RX ORDER — SODIUM CHLORIDE 0.9 % (FLUSH) 0.9 %
10 SYRINGE (ML) INJECTION EVERY 6 HOURS
Status: DISCONTINUED | OUTPATIENT
Start: 2023-07-10 | End: 2023-07-13 | Stop reason: HOSPADM

## 2023-07-10 RX ORDER — LOSARTAN POTASSIUM 50 MG/1
100 TABLET ORAL DAILY
Status: DISCONTINUED | OUTPATIENT
Start: 2023-07-10 | End: 2023-07-11

## 2023-07-10 RX ORDER — FENTANYL CITRATE 50 UG/ML
INJECTION, SOLUTION INTRAMUSCULAR; INTRAVENOUS CODE/TRAUMA/SEDATION MEDICATION
Status: COMPLETED | OUTPATIENT
Start: 2023-07-10 | End: 2023-07-10

## 2023-07-10 RX ORDER — ATORVASTATIN CALCIUM 10 MG/1
10 TABLET, FILM COATED ORAL DAILY
Status: DISCONTINUED | OUTPATIENT
Start: 2023-07-10 | End: 2023-07-13 | Stop reason: HOSPADM

## 2023-07-10 RX ORDER — CEFAZOLIN SODIUM 2 G/50ML
SOLUTION INTRAVENOUS
Status: DISPENSED
Start: 2023-07-10 | End: 2023-07-10

## 2023-07-10 RX ORDER — SODIUM CHLORIDE, SODIUM LACTATE, POTASSIUM CHLORIDE, CALCIUM CHLORIDE 600; 310; 30; 20 MG/100ML; MG/100ML; MG/100ML; MG/100ML
INJECTION, SOLUTION INTRAVENOUS CONTINUOUS
Status: DISCONTINUED | OUTPATIENT
Start: 2023-07-10 | End: 2023-07-11

## 2023-07-10 RX ORDER — KETAMINE HYDROCHLORIDE 10 MG/ML
INJECTION, SOLUTION INTRAMUSCULAR; INTRAVENOUS CODE/TRAUMA/SEDATION MEDICATION
Status: COMPLETED | OUTPATIENT
Start: 2023-07-10 | End: 2023-07-10

## 2023-07-10 RX ORDER — DEXAMETHASONE SODIUM PHOSPHATE 4 MG/ML
INJECTION, SOLUTION INTRA-ARTICULAR; INTRALESIONAL; INTRAMUSCULAR; INTRAVENOUS; SOFT TISSUE
Status: DISCONTINUED | OUTPATIENT
Start: 2023-07-10 | End: 2023-07-10

## 2023-07-10 RX ORDER — ACETAMINOPHEN 325 MG/1
650 TABLET ORAL EVERY 8 HOURS PRN
Status: DISCONTINUED | OUTPATIENT
Start: 2023-07-10 | End: 2023-07-13 | Stop reason: HOSPADM

## 2023-07-10 RX ORDER — ONDANSETRON 4 MG/1
4 TABLET, ORALLY DISINTEGRATING ORAL EVERY 6 HOURS PRN
Status: DISCONTINUED | OUTPATIENT
Start: 2023-07-10 | End: 2023-07-13 | Stop reason: HOSPADM

## 2023-07-10 RX ORDER — ENOXAPARIN SODIUM 100 MG/ML
30 INJECTION SUBCUTANEOUS
Status: DISCONTINUED | OUTPATIENT
Start: 2023-07-10 | End: 2023-07-13 | Stop reason: HOSPADM

## 2023-07-10 RX ORDER — AMLODIPINE BESYLATE 5 MG/1
5 TABLET ORAL DAILY
Status: DISCONTINUED | OUTPATIENT
Start: 2023-07-10 | End: 2023-07-13

## 2023-07-10 RX ORDER — SODIUM CHLORIDE, SODIUM GLUCONATE, SODIUM ACETATE, POTASSIUM CHLORIDE AND MAGNESIUM CHLORIDE 30; 37; 368; 526; 502 MG/100ML; MG/100ML; MG/100ML; MG/100ML; MG/100ML
INJECTION, SOLUTION INTRAVENOUS CONTINUOUS
Status: DISCONTINUED | OUTPATIENT
Start: 2023-07-10 | End: 2023-07-11

## 2023-07-10 RX ORDER — PANTOPRAZOLE SODIUM 40 MG/1
40 TABLET, DELAYED RELEASE ORAL DAILY
Status: DISCONTINUED | OUTPATIENT
Start: 2023-07-10 | End: 2023-07-13 | Stop reason: HOSPADM

## 2023-07-10 RX ORDER — SODIUM CITRATE AND CITRIC ACID MONOHYDRATE 334; 500 MG/5ML; MG/5ML
30 SOLUTION ORAL ONCE
Status: COMPLETED | OUTPATIENT
Start: 2023-07-10 | End: 2023-07-10

## 2023-07-10 RX ORDER — ACETAMINOPHEN 10 MG/ML
INJECTION, SOLUTION INTRAVENOUS
Status: DISCONTINUED | OUTPATIENT
Start: 2023-07-10 | End: 2023-07-10

## 2023-07-10 RX ORDER — ACETAMINOPHEN 325 MG/1
650 TABLET ORAL EVERY 4 HOURS PRN
Status: DISCONTINUED | OUTPATIENT
Start: 2023-07-10 | End: 2023-07-13 | Stop reason: HOSPADM

## 2023-07-10 RX ORDER — HYDROMORPHONE HYDROCHLORIDE 2 MG/ML
0.4 INJECTION, SOLUTION INTRAMUSCULAR; INTRAVENOUS; SUBCUTANEOUS EVERY 5 MIN PRN
Status: DISCONTINUED | OUTPATIENT
Start: 2023-07-10 | End: 2023-07-10 | Stop reason: HOSPADM

## 2023-07-10 RX ORDER — ONDANSETRON 2 MG/ML
4 INJECTION INTRAMUSCULAR; INTRAVENOUS DAILY PRN
Status: DISCONTINUED | OUTPATIENT
Start: 2023-07-10 | End: 2023-07-10 | Stop reason: HOSPADM

## 2023-07-10 RX ORDER — ALBUTEROL SULFATE 90 UG/1
2 AEROSOL, METERED RESPIRATORY (INHALATION) EVERY 6 HOURS PRN
Status: DISCONTINUED | OUTPATIENT
Start: 2023-07-10 | End: 2023-07-13 | Stop reason: HOSPADM

## 2023-07-10 RX ORDER — LIDOCAINE HYDROCHLORIDE 10 MG/ML
1 INJECTION, SOLUTION EPIDURAL; INFILTRATION; INTRACAUDAL; PERINEURAL ONCE
Status: DISCONTINUED | OUTPATIENT
Start: 2023-07-10 | End: 2023-07-10 | Stop reason: HOSPADM

## 2023-07-10 RX ORDER — CARVEDILOL 3.12 MG/1
3.12 TABLET ORAL 2 TIMES DAILY
Status: DISCONTINUED | OUTPATIENT
Start: 2023-07-10 | End: 2023-07-13 | Stop reason: HOSPADM

## 2023-07-10 RX ORDER — VANCOMYCIN HYDROCHLORIDE 1 G/20ML
INJECTION, POWDER, LYOPHILIZED, FOR SOLUTION INTRAVENOUS
Status: DISCONTINUED | OUTPATIENT
Start: 2023-07-10 | End: 2023-07-10 | Stop reason: HOSPADM

## 2023-07-10 RX ORDER — METHOCARBAMOL 100 MG/ML
INJECTION, SOLUTION INTRAMUSCULAR; INTRAVENOUS
Status: COMPLETED
Start: 2023-07-10 | End: 2023-07-10

## 2023-07-10 RX ORDER — OXYCODONE HYDROCHLORIDE 5 MG/1
5 TABLET ORAL EVERY 4 HOURS PRN
Status: DISCONTINUED | OUTPATIENT
Start: 2023-07-10 | End: 2023-07-13 | Stop reason: HOSPADM

## 2023-07-10 RX ORDER — INSULIN ASPART 100 [IU]/ML
1-10 INJECTION, SOLUTION INTRAVENOUS; SUBCUTANEOUS EVERY 6 HOURS PRN
Status: DISCONTINUED | OUTPATIENT
Start: 2023-07-10 | End: 2023-07-13 | Stop reason: HOSPADM

## 2023-07-10 RX ORDER — KETAMINE HCL IN 0.9 % NACL 50 MG/5 ML
SYRINGE (ML) INTRAVENOUS
Status: DISPENSED
Start: 2023-07-10 | End: 2023-07-10

## 2023-07-10 RX ORDER — SODIUM CHLORIDE 0.9 % (FLUSH) 0.9 %
10 SYRINGE (ML) INJECTION
Status: DISCONTINUED | OUTPATIENT
Start: 2023-07-10 | End: 2023-07-13 | Stop reason: HOSPADM

## 2023-07-10 RX ORDER — OXYCODONE HYDROCHLORIDE 10 MG/1
10 TABLET ORAL EVERY 4 HOURS PRN
Status: DISCONTINUED | OUTPATIENT
Start: 2023-07-10 | End: 2023-07-13

## 2023-07-10 RX ORDER — LIDOCAINE HYDROCHLORIDE 20 MG/ML
INJECTION, SOLUTION EPIDURAL; INFILTRATION; INTRACAUDAL; PERINEURAL
Status: DISCONTINUED | OUTPATIENT
Start: 2023-07-10 | End: 2023-07-10

## 2023-07-10 RX ADMIN — SODIUM CHLORIDE, SODIUM GLUCONATE, SODIUM ACETATE, POTASSIUM CHLORIDE AND MAGNESIUM CHLORIDE: 526; 502; 368; 37; 30 INJECTION, SOLUTION INTRAVENOUS at 11:07

## 2023-07-10 RX ADMIN — KETAMINE HYDROCHLORIDE 10 MG: 10 INJECTION INTRAMUSCULAR; INTRAVENOUS at 12:07

## 2023-07-10 RX ADMIN — ACETAMINOPHEN 1000 MG: 10 INJECTION, SOLUTION INTRAVENOUS at 12:07

## 2023-07-10 RX ADMIN — CHLORTHALIDONE 25 MG: 25 TABLET ORAL at 08:07

## 2023-07-10 RX ADMIN — HYDROMORPHONE HYDROCHLORIDE 0.4 MG: 2 INJECTION, SOLUTION INTRAMUSCULAR; INTRAVENOUS; SUBCUTANEOUS at 01:07

## 2023-07-10 RX ADMIN — SODIUM CHLORIDE, POTASSIUM CHLORIDE, SODIUM LACTATE AND CALCIUM CHLORIDE: 600; 310; 30; 20 INJECTION, SOLUTION INTRAVENOUS at 05:07

## 2023-07-10 RX ADMIN — LOSARTAN POTASSIUM 100 MG: 50 TABLET, FILM COATED ORAL at 08:07

## 2023-07-10 RX ADMIN — PROPOFOL 100 MG: 10 INJECTION, EMULSION INTRAVENOUS at 11:07

## 2023-07-10 RX ADMIN — ONDANSETRON 4 MG: 2 INJECTION INTRAMUSCULAR; INTRAVENOUS at 12:07

## 2023-07-10 RX ADMIN — ROCURONIUM BROMIDE 50 MG: 10 SOLUTION INTRAVENOUS at 11:07

## 2023-07-10 RX ADMIN — SERTRALINE HYDROCHLORIDE 100 MG: 50 TABLET ORAL at 08:07

## 2023-07-10 RX ADMIN — AMLODIPINE BESYLATE 5 MG: 5 TABLET ORAL at 08:07

## 2023-07-10 RX ADMIN — Medication 10 ML: at 09:07

## 2023-07-10 RX ADMIN — CEFAZOLIN SODIUM 2 G: 2 SOLUTION INTRAVENOUS at 09:07

## 2023-07-10 RX ADMIN — HYDROMORPHONE HYDROCHLORIDE 0.4 MG: 2 INJECTION, SOLUTION INTRAMUSCULAR; INTRAVENOUS; SUBCUTANEOUS at 02:07

## 2023-07-10 RX ADMIN — KETAMINE HYDROCHLORIDE 15 MG: 10 INJECTION INTRAMUSCULAR; INTRAVENOUS at 12:07

## 2023-07-10 RX ADMIN — CARVEDILOL 3.12 MG: 3.12 TABLET, FILM COATED ORAL at 08:07

## 2023-07-10 RX ADMIN — DEXAMETHASONE SODIUM PHOSPHATE 4 MG: 4 INJECTION, SOLUTION INTRA-ARTICULAR; INTRALESIONAL; INTRAMUSCULAR; INTRAVENOUS; SOFT TISSUE at 11:07

## 2023-07-10 RX ADMIN — OXYCODONE HYDROCHLORIDE 10 MG: 10 TABLET ORAL at 01:07

## 2023-07-10 RX ADMIN — FENTANYL CITRATE 50 MCG: 50 INJECTION, SOLUTION INTRAMUSCULAR; INTRAVENOUS at 12:07

## 2023-07-10 RX ADMIN — PANTOPRAZOLE SODIUM 40 MG: 40 TABLET, DELAYED RELEASE ORAL at 08:07

## 2023-07-10 RX ADMIN — SUGAMMADEX 200 MG: 100 INJECTION, SOLUTION INTRAVENOUS at 12:07

## 2023-07-10 RX ADMIN — ATORVASTATIN CALCIUM 10 MG: 10 TABLET, FILM COATED ORAL at 08:07

## 2023-07-10 RX ADMIN — CARVEDILOL 3.12 MG: 3.12 TABLET, FILM COATED ORAL at 09:07

## 2023-07-10 RX ADMIN — TETANUS TOXOID, REDUCED DIPHTHERIA TOXOID AND ACELLULAR PERTUSSIS VACCINE, ADSORBED 0.5 ML: 5; 2.5; 8; 8; 2.5 SUSPENSION INTRAMUSCULAR at 02:07

## 2023-07-10 RX ADMIN — FUROSEMIDE 20 MG: 20 TABLET ORAL at 08:07

## 2023-07-10 RX ADMIN — CEFAZOLIN SODIUM 2 G: 2 SOLUTION INTRAVENOUS at 11:07

## 2023-07-10 RX ADMIN — LIDOCAINE HYDROCHLORIDE 40 MG: 20 INJECTION, SOLUTION EPIDURAL; INFILTRATION; INTRACAUDAL; PERINEURAL at 11:07

## 2023-07-10 RX ADMIN — METHOCARBAMOL 1000 MG: 100 INJECTION, SOLUTION INTRAMUSCULAR; INTRAVENOUS at 02:07

## 2023-07-10 RX ADMIN — FENTANYL CITRATE 50 MCG: 50 INJECTION, SOLUTION INTRAMUSCULAR; INTRAVENOUS at 11:07

## 2023-07-10 RX ADMIN — SODIUM CITRATE AND CITRIC ACID MONOHYDRATE 30 ML: 500; 334 SOLUTION ORAL at 11:07

## 2023-07-10 RX ADMIN — ENOXAPARIN SODIUM 30 MG: 30 INJECTION SUBCUTANEOUS at 05:07

## 2023-07-10 RX ADMIN — KETAMINE HYDROCHLORIDE 20 MG: 10 INJECTION INTRAMUSCULAR; INTRAVENOUS at 12:07

## 2023-07-10 NOTE — H&P
"   Trauma Surgery   History and Physical Note    Patient Name: Salma Mitchell  YOB: 1935  Date: 07/10/2023 3:15 AM  Date of Admission: 7/9/2023  HD#0  POD#* No surgery found *    PRESENTING HISTORY   Chief Complaint/Reason for Admission: Ground level fall, right wrist dislocation     History of Present Illness:  86yo F w/ hx of afib on eliquis, HTN, HLD, DM, COPD who presents s/p GLF earlier today. She was turning around in her kitchen when her "legs came out from under her". She braced herself with her right wrist and thinks she "bumped" her eyebrow on the countertop. Denies LOC. Only complains of wrist pain. GCS 15, HDS. Right wrist noted to have dislocation. Attempts to reduce under sedation in ED were unsuccessful. Denies tobacco, alcohol, or illicit drug use.  Ortho consulted with plans for operative reduction. Medicine consulted to assist with medical comorbidities. Trauma consulted for admission due to mechanism of presentation.     Review of Systems:  12 point ROS negative except as stated in HPI    PAST HISTORY:   Past medical history:  Past Medical History:   Diagnosis Date    Asthma     Diabetes mellitus     HLD (hyperlipidemia)     Hypertension     RONAL (obstructive sleep apnea)     SOB (shortness of breath)     Unspecified cataract      Past Medical History:   Diagnosis Date    Asthma     Diabetes mellitus     HLD (hyperlipidemia)     Hypertension     RONAL (obstructive sleep apnea)     SOB (shortness of breath)     Unspecified cataract        Past surgical history:  Past Surgical History:   Procedure Laterality Date    CHOLECYSTECTOMY      HYSTERECTOMY       Past Surgical History:   Procedure Laterality Date    CHOLECYSTECTOMY      HYSTERECTOMY         Family history:  No family history on file.    Social history:  Social History     Socioeconomic History    Marital status:    Tobacco Use    Smoking status: Former    Smokeless tobacco: Never     Social History     Tobacco Use "   Smoking Status Former   Smokeless Tobacco Never      Social History     Substance and Sexual Activity   Alcohol Use Not on file        MEDICATIONS & ALLERGIES:   Allergies:   Review of patient's allergies indicates:   Allergen Reactions    Adhesive     Center-al house dust     Dairy aid [lactase]     Grass pollen-june grass standard     Lexapro [escitalopram]     Pcn [penicillins]     Shellfish containing products      Home Meds:   Current Outpatient Medications   Medication Instructions    ADVAIR DISKUS 250-50 mcg/dose diskus inhaler 1 puff, Inhalation, 2 times daily    albuterol (PROVENTIL/VENTOLIN HFA) 90 mcg/actuation inhaler 2 puffs, Inhalation, Every 6 hours PRN, Rescue    ALPRAZolam (XANAX) 1 mg, Oral, Daily PRN    amLODIPine (NORVASC) 5 mg, Oral, Daily    atorvastatin (LIPITOR) 10 mg, Oral, Daily    carvediloL (COREG) 3.125 mg, Oral, 2 times daily    chlorthalidone (HYGROTEN) 25 mg, Oral, Daily, Take 2 tabs    furosemide (LASIX) 20 mg, Oral, Daily    gabapentin (NEURONTIN) 300 mg, Oral, 3 times daily    glipiZIDE (GLUCOTROL) 5 mg, Oral, With breakfast    losartan (COZAAR) 100 mg, Oral, Daily    pantoprazole (PROTONIX) 40 mg, Oral, Daily    polyethylene glycol (GLYCOLAX) 17 g, Oral, Daily    potassium chloride SA (K-DUR,KLOR-CON M) 10 MEQ tablet 10 mEq, Oral, Daily, Take 2 tabs    sertraline (ZOLOFT) 100 mg, Oral, Daily      No current facility-administered medications on file prior to encounter.     Current Outpatient Medications on File Prior to Encounter   Medication Sig Dispense Refill    ADVAIR DISKUS 250-50 mcg/dose diskus inhaler Inhale 1 puff into the lungs 2 (two) times daily.      albuterol (PROVENTIL/VENTOLIN HFA) 90 mcg/actuation inhaler Inhale 2 puffs into the lungs every 6 (six) hours as needed for Wheezing. Rescue      ALPRAZolam (XANAX) 1 MG tablet Take 1 mg by mouth daily as needed.      amLODIPine (NORVASC) 5 MG tablet Take 5 mg by mouth once daily.      atorvastatin (LIPITOR) 10 MG tablet  Take 10 mg by mouth once daily.      carvediloL (COREG) 3.125 MG tablet Take 1 tablet (3.125 mg total) by mouth 2 (two) times daily. 60 tablet 11    chlorthalidone (HYGROTEN) 25 MG Tab Take 25 mg by mouth once daily. Take 2 tabs      furosemide (LASIX) 20 MG tablet Take 1 tablet (20 mg total) by mouth once daily. 30 tablet 11    gabapentin (NEURONTIN) 300 MG capsule Take 300 mg by mouth 3 (three) times daily.      glipiZIDE (GLUCOTROL) 10 MG TR24 Take 5 mg by mouth daily with breakfast.      losartan (COZAAR) 100 MG tablet Take 100 mg by mouth once daily.      pantoprazole (PROTONIX) 40 MG tablet Take 40 mg by mouth once daily.      polyethylene glycol (GLYCOLAX) 17 gram PwPk Take 17 g by mouth once daily.      potassium chloride SA (K-DUR,KLOR-CON M) 10 MEQ tablet Take 10 mEq by mouth once daily. Take 2 tabs      sertraline (ZOLOFT) 100 MG tablet Take 100 mg by mouth once daily.        No current facility-administered medications on file prior to encounter.     Current Outpatient Medications on File Prior to Encounter   Medication Sig    ADVAIR DISKUS 250-50 mcg/dose diskus inhaler Inhale 1 puff into the lungs 2 (two) times daily.    albuterol (PROVENTIL/VENTOLIN HFA) 90 mcg/actuation inhaler Inhale 2 puffs into the lungs every 6 (six) hours as needed for Wheezing. Rescue    ALPRAZolam (XANAX) 1 MG tablet Take 1 mg by mouth daily as needed.    amLODIPine (NORVASC) 5 MG tablet Take 5 mg by mouth once daily.    atorvastatin (LIPITOR) 10 MG tablet Take 10 mg by mouth once daily.    carvediloL (COREG) 3.125 MG tablet Take 1 tablet (3.125 mg total) by mouth 2 (two) times daily.    chlorthalidone (HYGROTEN) 25 MG Tab Take 25 mg by mouth once daily. Take 2 tabs    furosemide (LASIX) 20 MG tablet Take 1 tablet (20 mg total) by mouth once daily.    gabapentin (NEURONTIN) 300 MG capsule Take 300 mg by mouth 3 (three) times daily.    glipiZIDE (GLUCOTROL) 10 MG TR24 Take 5 mg by mouth daily with breakfast.    losartan  "(COZAAR) 100 MG tablet Take 100 mg by mouth once daily.    pantoprazole (PROTONIX) 40 MG tablet Take 40 mg by mouth once daily.    polyethylene glycol (GLYCOLAX) 17 gram PwPk Take 17 g by mouth once daily.    potassium chloride SA (K-DUR,KLOR-CON M) 10 MEQ tablet Take 10 mEq by mouth once daily. Take 2 tabs    sertraline (ZOLOFT) 100 MG tablet Take 100 mg by mouth once daily.     Scheduled Meds:   ketamine in 0.9 % sod chloride           OBJECTIVE:   Vital Signs:  VITAL SIGNS: 24 HR MIN & MAX LAST   Temp  Min: 97.7 °F (36.5 °C)  Max: 97.7 °F (36.5 °C)  97.7 °F (36.5 °C)   BP  Min: 118/63  Max: 222/77  (!) 169/95    Pulse  Min: 62  Max: 80  68    Resp  Min: 11  Max: 23  13    SpO2  Min: 93 %  Max: 100 %  (!) 93 %      HT: 5' 1" (154.9 cm)  WT: 97.1 kg (214 lb)  BMI: 40.5   Intake/output: No intake/output data recorded.     Lines/drains/airway:       Peripheral IV - Single Lumen 07/09/23 2300 22 G Anterior;Right Wrist (Active)   Site Assessment Clean;Intact;Dry 07/09/23 2317   Number of days: 0       Physical Exam:  General:  Well developed, well nourished, no acute distress  HEENT:  Normocephalic, atraumatic  CV:  RR, 2+ DPs bilaterally  Resp: NWOB  GI:  Abdomen soft, non-tender, non-distended  :  Deferred  MSK:  No muscle atrophy, cyanosis, moving all extremities spontaneously. Mild peripheral edema which is baseline. Right wrist in splint and sling. Motor/sensation intact. Good cap refill   Neuro: GCS 15. CNII-XII grossly intact, alert and oriented to person, place, and time. Strength and motor function grossly intact to all extremities, sensation intact to all extremities.  Skin/Wounds:  small bruise lateral to right eyebrow.     Labs:  Troponin:  No results for input(s): TROPONINI in the last 72 hours.  CBC:  Recent Labs     07/10/23  0103   WBC 11.16   RBC 3.27*   HGB 9.4*   HCT 28.8*      MCV 88.1   MCH 28.7   MCHC 32.6*     CMP:  Recent Labs     07/10/23  0103   CALCIUM 9.4   ALBUMIN 3.9      K " 4.2   CO2 24   BUN 49.1*   CREATININE 2.47*   ALKPHOS 114   ALT 10   AST 13   BILITOT 0.5     Lactic Acid:  No results for input(s): LACTATE in the last 72 hours.  ETOH:  No results for input(s): ETHANOL in the last 72 hours.   Urine Drug Screen:  No results for input(s): COCAINE, OPIATE, BARBITURATE, AMPHETAMINE, FENTANYL, CANNABINOIDS, MDMA in the last 72 hours.    Invalid input(s): BENZODIAZEPINE, PHENCYCLIDINE   ABG  No results for input(s): PH, PO2, PCO2, HCO3, BE in the last 168 hours.      Diagnostic Results:  CT Head Without Contrast         X-Ray Wrist Complete Right    (Results Pending)   X-Ray Chest AP Portable    (Results Pending)   X-Ray Wrist 2 View Right    (Results Pending)       ASSESSMENT & PLAN:    86yo F s/p GLF with right wrist dislocation   - admit to trauma service for mechanism of injury   - ortho consulted for right wrist dislocation. Plans for OR for operative reduction today   - medicine consulted to assist with multiple medical comorbidities. Appreciate assistance   - NPO until post-op. Ok for sips with meds   - hold Eliquis   - lovenox ppx  - PT/OT. CM consulted to assist with dispo  - restart home meds     .Leisa Madrid MD   [employer] General Surgery PGY 4

## 2023-07-10 NOTE — ANESTHESIA PROCEDURE NOTES
Intubation    Date/Time: 7/10/2023 11:47 AM  Performed by: Beni Webster CRNA  Authorized by: Beni Webster CRNA     Intubation:     Induction:  Intravenous    Intubated:  Postinduction    Mask Ventilation:  Easy mask    Attempts:  1    Attempted By:  CRNA    Method of Intubation:  Direct    Blade:  Kern 2    Laryngeal View Grade: Grade I - full view of cords      Difficult Airway Encountered?: No      Complications:  None    Airway Device:  Oral endotracheal tube    Airway Device Size:  7.0    Inflation Amount (mL):  9    Tube secured:  22    Secured at:  The lips    Placement Verified By:  Capnometry    Complicating Factors:  None    Findings Post-Intubation:  BS equal bilateral and atraumatic/condition of teeth unchanged

## 2023-07-10 NOTE — ED PROVIDER NOTES
"Encounter Date: 7/9/2023    SCRIBE #1 NOTE: I, Ayad Goel, am scribing for, and in the presence of,  Aaliyah Silva MD. I have scribed the following portions of the note - Other sections scribed: HPI, ROS, PE.     History     Chief Complaint   Patient presents with    Fall     PT SLIPPED ONTO SLIPPERS. TAKES ELIQUIS. PER EMS PT DID NOT HIT HER HEAD FROM THE FALL, PT STATES "I TURNED MY HEAD IN FRUSTRATION FROM FALLING AND BUMPED MY EYEBROW ON THE COUNTER". DEFORMITY TO THE RIGHT WRIST. HEVER SPLINT PER EMS.      88 y/o right-handed female with a history of HTN, HLD, COPD, DM, asthma presents to the ED via EMS after a fall. Pt says she turned to get to her sink after opening the refrigerator and fell on her right side. Did not hit her head from the fall but bumped it on her counter. Complains of right wrist pain, given 95 of fentanyl by EMS per nursing staff. Last tetanus unknown. PCP Dr. Jv Mead.    The history is provided by the patient (and nursing staff). No  was used.   Review of patient's allergies indicates:   Allergen Reactions    Adhesive     Center-al house dust     Dairy aid [lactase]     Grass pollen-june grass standard     Lexapro [escitalopram]     Pcn [penicillins]     Shellfish containing products      Past Medical History:   Diagnosis Date    Asthma     Diabetes mellitus     HLD (hyperlipidemia)     Hypertension     RONAL (obstructive sleep apnea)     SOB (shortness of breath)     Unspecified cataract      Past Surgical History:   Procedure Laterality Date    CHOLECYSTECTOMY      HYSTERECTOMY       No family history on file.  Social History     Tobacco Use    Smoking status: Former    Smokeless tobacco: Never     Review of Systems   Constitutional:  Negative for activity change, diaphoresis, fatigue and fever.   HENT:  Negative for congestion, postnasal drip, rhinorrhea, sinus pain, sneezing and sore throat.    Respiratory:  Negative for cough, chest tightness, shortness " of breath and wheezing.    Cardiovascular:  Negative for chest pain, palpitations and leg swelling.   Gastrointestinal:  Negative for abdominal distention, abdominal pain and blood in stool.   Genitourinary:  Negative for decreased urine volume, difficulty urinating and dysuria.   Musculoskeletal:         Right wrist pain   Skin:  Negative for color change and pallor.   Neurological:  Negative for dizziness, speech difficulty, weakness, light-headedness and numbness.   All other systems reviewed and are negative.    Physical Exam     Initial Vitals [07/09/23 2244]   BP Pulse Resp Temp SpO2   (!) 153/50 62 18 97.7 °F (36.5 °C) 97 %      MAP       --         Physical Exam    Nursing note and vitals reviewed.  Constitutional: She appears well-developed and well-nourished. She is not diaphoretic. No distress.   HENT:   Head: Normocephalic.   Nose: Nose normal.   Mouth/Throat: Oropharynx is clear and moist.   Small abrasion to the right eyebrow   Eyes: Conjunctivae and EOM are normal. Pupils are equal, round, and reactive to light.   Neck: Trachea normal. Neck supple.   Normal range of motion.  Cardiovascular:  Normal rate, regular rhythm, normal heart sounds and intact distal pulses.           No murmur heard.  Pulmonary/Chest: Breath sounds normal. No respiratory distress. She has no wheezes. She has no rhonchi. She has no rales. She exhibits no tenderness.   Abdominal: Abdomen is soft. Bowel sounds are normal. She exhibits no distension and no mass. There is no abdominal tenderness. There is no rebound and no guarding.   Musculoskeletal:         General: No edema.      Right wrist: Deformity present.      Cervical back: Normal range of motion and neck supple.      Lumbar back: Normal. Normal range of motion.      Comments: ROM of right wrist limited to due to pain     Neurological: She is alert and oriented to person, place, and time. She has normal strength. No cranial nerve deficit or sensory deficit.   Skin: Skin  "is warm and dry. Capillary refill takes less than 2 seconds. No abscess noted. No erythema. No pallor.   Psychiatric: She has a normal mood and affect. Her behavior is normal. Judgment and thought content normal.       ED Course   Procedural Sedation        Date/Time: 7/10/2023 1:24 AM  Performed by: Aaliyah Silva MD  Authorized by: Alaiyah Silva MD   Consent Done: Yes  Consent: Verbal consent obtained.  Risks and benefits: risks, benefits and alternatives were discussed  Consent given by: patient  Patient understanding: patient states understanding of the procedure being performed  Time out: Immediately prior to procedure a "time out" was called to verify the correct patient, procedure, equipment, support staff and site/side marked as required.  ASA Class: Class 3 - Systemic Illness with functional impairment.  Mallampati Score: Class 1 - Visualization of the soft palate, fauces, uvula, and anterior/posterior pillars.   NPO STATUS:  Date/Time of last solid: 7/9/2023 7:28 PM    Equipment: on cardiac monitor., on BP monitor., on CO2 monitor., on supplemental oxygen., suction available. and airway equipment available.     Sedation type: moderate (conscious) sedation    Sedatives: ketamine  Analgesia: fentanyl  Sedation start date/time: 7/10/2023 12:18 AM  Sedation end date/time: 7/10/2023 12:45 AM  Total Sedation Time (min): 27  Vitals: Vital signs were monitored during sedation.  Complications: No complications.       Orthopedic Injury    Date/Time: 7/10/2023 1:24 AM  Performed by: Aaliyah Silva MD  Authorized by: Aaliyah Silva MD     Location procedure was performed:  [facility] EMERGENCY DEPARTMENT  Consent Done?:  Yes  Universal Protocol:     Verbal consent obtained?: Yes      Risks and benefits: Risks, benefits and alternatives were discussed      Consent given by:  Patient    Patient states understanding of procedure being performed: Yes      Time Out: Immediately prior to the procedure a time out " was called    Injury:     Injury location:  Wrist    Location details:  Right wrist    Injury type:  Fracture    Fracture type: distal radius and ulnar styloid      Fracture type: distal radius and ulnar styloid        Pre-procedure assessment:     Neurovascular status: Neurovascularly intact      Distal perfusion: normal      Neurological function: normal      Range of motion: reduced      Local anesthesia used?: No      Patient sedated?: Yes      ASA Class:  Class 3 - Systemic Illness with functional impairment.    Mallampati Score:  Class 1 - Visualization of the soft palate, fauces, uvula, and anterior/posterior pillars.    Patient/Family history of anesthesia or sedation complications: No      Sedation type: moderate (conscious) sedation      Sedation:  Ketamine    Analgesia:  Fentanyl    Vital signs: Vital signs monitored during sedation        Selections made in this section will also lock the Injury type section above.:     Manipulation performed?: Yes      Skin traction used?: Yes      Skeletal traction used?: No      Reduction successful?: No      Confirmation: Reduction confirmed by x-ray      Immobilization:  Splint    Splint type:  Sugar tong    Complications: No    Post-procedure assessment:     Neurovascular status: Neurovascularly intact      Distal perfusion: normal      Neurological function: normal      Range of motion: splinted      Patient tolerance:  Patient tolerated the procedure well with no immediate complications  Labs Reviewed   COMPREHENSIVE METABOLIC PANEL - Abnormal; Notable for the following components:       Result Value    Glucose Level 220 (*)     Blood Urea Nitrogen 49.1 (*)     Creatinine 2.47 (*)     All other components within normal limits   CBC WITH DIFFERENTIAL - Abnormal; Notable for the following components:    RBC 3.27 (*)     Hgb 9.4 (*)     Hct 28.8 (*)     MCHC 32.6 (*)     MPV 10.7 (*)     IG# 0.13 (*)     All other components within normal limits   APTT - Normal    PROTIME-INR - Normal   CBC W/ AUTO DIFFERENTIAL    Narrative:     The following orders were created for panel order CBC auto differential.  Procedure                               Abnormality         Status                     ---------                               -----------         ------                     CBC with Differential[[id number]]        Abnormal            Final result                 Please view results for these tests on the individual orders.          Imaging Results              X-Ray Wrist 2 View Right (In process)  Result time 07/10/23 00:56:52   Procedure changed from X-Ray Wrist Complete Right                    X-Ray Chest AP Portable (In process)                      X-Ray Wrist Complete Right (In process)                      CT Head Without Contrast (Preliminary result)  Result time 07/09/23 23:38:41      Preliminary result by Devante Gaona MD (07/09/23 23:38:41)                   Narrative:    START OF REPORT:  Technique: CT of the head was performed without intravenous contrast with axial as well as coronal and sagittal images.    Comparison: None.    Dosage Information: Automated Exposure Control was utilized 1052.8 mGy.cm.    Clinical history: Fall, head injury.    Findings:  Hemorrhage: No acute intracranial hemorrhage is seen.  CSF spaces: The ventricles, sulci and basal cisterns all appear moderately prominent global cerebral atrophy.  Brain parenchyma: Moderate microvascular change is seen in portions of the periventricular and deep white matter tracts.  Cerebellum: Unremarkable.  Vascular: Unremarkable venous sinuses. Mild atheromatous calcification of the intracranial arteries is seen.  Sella and skull base: The sella appears to be within normal limits for age.  Cerebellopontine angles: Within normal limits.  Herniation: None.  Intracranial calcifications: Incidental note is made of bilateral choroid plexus calcification. Incidental note is made of some pineal region  calcification. Incidental note is made of some calcification of the falx.  Calvarium: No acute linear or depressed skull fracture is seen.    Maxillofacial Structures:  Paranasal sinuses: There is some mucoperiosteal thickening right maxillary sinuses and bilateral ethmoid air cells. This is consistent with sinusitis. The rest of the paranasal sinuses appear clear.  Orbits: The orbits appear unremarkable.  Zygomatic arches: The zygomatic arches are intact and unremarkable.  Temporal bones and mastoids: The temporal bones and mastoids appear unremarkable.  TMJ: The mandibular condyles appear normally placed with respect to the mandibular fossa.  Nasal Bones: The nasal septum is midline.    Visualized upper cervical spine: The visualized cervical spine appears unremarkable.      Impression:  1. No acute intracranial traumatic injury identified. Details and findings as noted above.                                      X-Rays:   Independently Interpreted Readings:   Other Readings:  Wrist xr with distal fracture dislocation repeat post reduction attempt without much improvement  Cxr without abnormality  Ct head without abnoramlity  Medications   ketamine in 0.9 % sod chloride 50 mg/5 mL (10 mg/mL) injection (50 mg  Not Given 7/10/23 0018)   fentaNYL (SUBLIMAZE) 50 mcg/mL injection (50 mcg Intravenous Given 7/10/23 0000)   Tdap (BOOSTRIX) vaccine injection 0.5 mL (0.5 mLs Intramuscular Given 7/10/23 0212)   fentaNYL 50 mcg/mL injection (50 mcg Intravenous Given 7/10/23 0026)   ketamine injection (15 mg Intravenous Given 7/10/23 0036)     Medical Decision Making:   History:   I obtained history from: EMS provider.  Old Medical Records: I decided to obtain old medical records.  Old Records Summarized: records from clinic visits.  Initial Assessment:   See hpi  Independently Interpreted Test(s):   I have ordered and independently interpreted X-rays - see prior notes.  Clinical Tests:   Lab Tests: Ordered and  Reviewed  Radiological Study: Ordered and Reviewed  Other:   I have discussed this case with another health care provider.        Scribe Attestation:   Scribe #1: I performed the above scribed service and the documentation accurately describes the services I performed. I attest to the accuracy of the note.  Comments: Attending:   Physician Attestation Statement for Scribe #1: I, Aaliyah Silva MD, personally performed the services described in this documentation. All medical record entries made by the scribe were at my direction and in my presence.  I have reviewed the chart and agree that the record reflects my personal performance and is accurate and complete.        Attending Attestation:           Physician Attestation for Scribe:  Physician Attestation Statement for Scribe #1: I, Aaliyah Silva MD, reviewed documentation, as scribed by Ayad Goel in my presence, and it is both accurate and complete.       Medical Decision Making  The differential diagnosis includes, but is not limited to: fracture, dislocation, head injury  Cbc, cmp, coags, wrist xr and ct head ordered and reviewed  Anemia and ckd with mild cr elevation noted on labs, wrist xr with significant fracture dislcoation, unable to improve alignment much with sedationa nd reduction  Consulted orthopedic surgery who plan to take to or today, post splinting neurovascular exam performed  Pt wanting nh placement, family has been urging her to for quite some time  Discussed with hospitalist who will see in consult and trauma who will admit    Problems Addressed:  Accident due to mechanical fall without injury, initial encounter: acute illness or injury that poses a threat to life or bodily functions  Benign essential hypertension: chronic illness or injury  Closed dislocation of carpometacarpal joint of right wrist, initial encounter: acute illness or injury that poses a threat to life or bodily functions  Other closed fracture of distal end of  right radius, initial encounter: acute illness or injury that poses a threat to life or bodily functions  Pain: acute illness or injury that poses a threat to life or bodily functions    Amount and/or Complexity of Data Reviewed  Independent Historian: EMS  External Data Reviewed: labs and notes.  Labs: ordered.  Radiology: ordered and independent interpretation performed.    Risk  OTC drugs.  Prescription drug management.  Parenteral controlled substances.  Decision regarding hospitalization.  Elective major surgery with identified risk factors.           ED Course as of 07/10/23 0304   Sun Jul 09, 2023   2358 Chest pain improving with bipap and nitropaste [BS]   Mon Jul 10, 2023   0046 EKG significantly limited by baseline motion artifact performed at 11:47 p.m. rate of 133 sinus tachycardia with ST depressions in the lateral leads [BS]   0115 Patient apparently has been considering nursing home placement and her children have been pushing her towards this.  Given that her dominant hand has sustained a significant injury she would like to be admitted for nursing home placement.  Sedation and reduction performed however unable to achieve anatomic alignment.  Orthopedic surgery will tentatively plan to take her to the operating room today [BS]   0233 Hospitalist paged [JG]   0247 Hospitalist will see in consult however since she will be admitted and having wrist surgery recommend trauma admit [BS]   0247 Trauma paged [JG]      ED Course User Index  [BS] Aaliyah Silva MD  [JG] Ayad Goel                   Clinical Impression:   Final diagnoses:  [R52] Pain  [W19.XXXA] Accident due to mechanical fall without injury, initial encounter (Primary)  [S63.054A] Closed dislocation of carpometacarpal joint of right wrist, initial encounter  [S52.591A] Other closed fracture of distal end of right radius, initial encounter  [I10] Benign essential hypertension        ED Disposition Condition    Admit Stable                 Aaliyah Silva MD  07/10/23 030

## 2023-07-10 NOTE — PROCEDURES
"Salma Mitchell is a 87 y.o. female patient.    Temp: 98.6 °F (37 °C) (07/10/23 0716)  Pulse: 72 (07/10/23 1610)  Resp: 19 (07/10/23 1420)  BP: 105/74 (07/10/23 1610)  SpO2: 96 % (07/10/23 1610)  Weight: 97.1 kg (214 lb) (07/09/23 2244)  Height: 5' 1" (154.9 cm) (07/09/23 2244)    PICC  Date/Time: 7/10/2023 5:21 PM  Performed by: Timoteo Renee RN  Consent Done: Yes  Indications: vascular access and med administration  Anesthesia: local infiltration  Local anesthetic: lidocaine 1% without epinephrine    Preparation: skin prepped with ChloraPrep  Skin prep agent dried: skin prep agent completely dried prior to procedure  Sterile barriers: all five maximum sterile barriers used - cap, mask, sterile gown, sterile gloves, and large sterile sheet  Hand hygiene: hand hygiene performed prior to central venous catheter insertion  Location details: left brachial  Catheter type: single lumen  Catheter size: 4 Fr  Catheter Length: 15cm    Ultrasound guidance: yes  Vessel Caliber: patent, compressibility normal  Needle advanced into vessel with real time Ultrasound guidance.  Guidewire confirmed in vessel.  Sterile sheath used.  Number of attempts: 1  Post-procedure: blood return through all ports, chlorhexidine patch and sterile dressing applied    Assessment: successful placement        Name Timoteo Renee RN  7/10/2023    "

## 2023-07-10 NOTE — CONSULTS
"[facility] - Emergency Dept  Orthopedics  Consult Note    Patient Name: Salma Mitchell  MRN: [id number]  Admission Date: 7/9/2023  Hospital Length of Stay: 0 days  Attending Provider: Bon Harman MD  Primary Care Provider: Jv Mead II, MD    Patient information was obtained from patient and ER records.     Consults  Subjective:       Chief Complaint:   Chief Complaint   Patient presents with    Fall     PT SLIPPED ONTO SLIPPERS. TAKES ELIQUIS. PER EMS PT DID NOT HIT HER HEAD FROM THE FALL, PT STATES "I TURNED MY HEAD IN FRUSTRATION FROM FALLING AND BUMPED MY EYEBROW ON THE COUNTER". DEFORMITY TO THE RIGHT WRIST. HEVER SPLINT PER EMS.         HPI:  Patient is a 87-year-old female who states she was attempting to place items in her refrigerator as well as in her syncope same time and when she turned, she slipped and fell.  She injured her right wrist and also hit her head.  She was brought to [facility] Emergency room for evaluation.  She has been admitted to trauma surgery service.  Orthopedics was consulted for her right wrist injury.  She is currently comfortable in a splint.  She denies any other orthopedic injuries or any other areas pain at this time.    Past Medical History:   Diagnosis Date    Asthma     Diabetes mellitus     HLD (hyperlipidemia)     Hypertension     RONAL (obstructive sleep apnea)     SOB (shortness of breath)     Unspecified cataract        Past Surgical History:   Procedure Laterality Date    CHOLECYSTECTOMY      HYSTERECTOMY         Review of patient's allergies indicates:   Allergen Reactions    Adhesive     Center-al house dust     Dairy aid [lactase]     Grass pollen-june grass standard     Lexapro [escitalopram]     Pcn [penicillins]     Shellfish containing products        Current Facility-Administered Medications   Medication    acetaminophen tablet 650 mg    acetaminophen tablet 650 mg    albuterol inhaler 2 puff    amLODIPine tablet 5 mg    " atorvastatin tablet 10 mg    carvediloL tablet 3.125 mg    chlorthalidone tablet 25 mg    dextrose 10% bolus 125 mL 125 mL    dextrose 10% bolus 250 mL 250 mL    enoxaparin injection 30 mg    furosemide tablet 20 mg    glucagon (human recombinant) injection 1 mg    insulin aspart U-100 injection 1-10 Units    ketamine in 0.9 % sod chloride 50 mg/5 mL (10 mg/mL) injection    lactated ringers infusion    LIDOcaine (PF) 10 mg/ml (1%) injection 10 mg    losartan tablet 100 mg    melatonin tablet 6 mg    ondansetron disintegrating tablet 4 mg    oxyCODONE immediate release tablet 5 mg    oxyCODONE immediate release tablet Tab 10 mg    pantoprazole EC tablet 40 mg    polyethylene glycol packet 17 g    sertraline tablet 100 mg    sodium chloride 0.9% flush 10 mL     Current Outpatient Medications   Medication Sig    ADVAIR DISKUS 250-50 mcg/dose diskus inhaler Inhale 1 puff into the lungs 2 (two) times daily.    albuterol (PROVENTIL/VENTOLIN HFA) 90 mcg/actuation inhaler Inhale 2 puffs into the lungs every 6 (six) hours as needed for Wheezing. Rescue    ALPRAZolam (XANAX) 1 MG tablet Take 1 mg by mouth daily as needed.    amLODIPine (NORVASC) 5 MG tablet Take 5 mg by mouth once daily.    atorvastatin (LIPITOR) 10 MG tablet Take 10 mg by mouth once daily.    carvediloL (COREG) 3.125 MG tablet Take 1 tablet (3.125 mg total) by mouth 2 (two) times daily.    chlorthalidone (HYGROTEN) 25 MG Tab Take 25 mg by mouth once daily. Take 2 tabs    furosemide (LASIX) 20 MG tablet Take 1 tablet (20 mg total) by mouth once daily.    gabapentin (NEURONTIN) 300 MG capsule Take 300 mg by mouth 3 (three) times daily.    glipiZIDE (GLUCOTROL) 10 MG TR24 Take 5 mg by mouth daily with breakfast.    losartan (COZAAR) 100 MG tablet Take 100 mg by mouth once daily.    pantoprazole (PROTONIX) 40 MG tablet Take 40 mg by mouth once daily.    polyethylene glycol (GLYCOLAX) 17 gram PwPk Take 17 g by mouth once daily.    potassium chloride SA  "(K-DUR,KLOR-CON M) 10 MEQ tablet Take 10 mEq by mouth once daily. Take 2 tabs    sertraline (ZOLOFT) 100 MG tablet Take 100 mg by mouth once daily.     Family History    None       Tobacco Use    Smoking status: Former    Smokeless tobacco: Never   Substance and Sexual Activity    Alcohol use: Not on file    Drug use: Not on file    Sexual activity: Not on file     Review of Systems   Constitutional: Negative for chills and fever.   HENT:  Negative for congestion and hearing loss.    Eyes:  Negative for visual disturbance.   Cardiovascular:  Negative for chest pain and syncope.   Respiratory:  Negative for cough and shortness of breath.    Hematologic/Lymphatic: Does not bruise/bleed easily.   Skin:  Negative for color change and rash.   Gastrointestinal:  Negative for abdominal pain, nausea and vomiting.   Genitourinary:  Negative for dysuria and hematuria.   Neurological:  Negative for numbness, sensory change and weakness.   Psychiatric/Behavioral:  Negative for altered mental status.    Objective:     Vital Signs (Most Recent):  Temp: 98.6 °F (37 °C) (07/10/23 0716)  Pulse: 63 (07/10/23 0859)  Resp: 14 (07/10/23 0859)  BP: (!) 179/90 (07/10/23 0819)  SpO2: 96 % (07/10/23 0859) Vital Signs (24h Range):  Temp:  [97.7 °F (36.5 °C)-98.6 °F (37 °C)] 98.6 °F (37 °C)  Pulse:  [61-92] 63  Resp:  [11-23] 14  SpO2:  [93 %-100 %] 96 %  BP: (118-222)/() 179/90     Weight: 97.1 kg (214 lb)  Height: 5' 1" (154.9 cm)  Body mass index is 40.43 kg/m².    No intake or output data in the 24 hours ending 07/10/23 0942    General    Constitutional: She is oriented to person, place, and time. She appears well-developed and well-nourished. No distress.   HENT:   Head: Normocephalic.   Eyes: EOM are normal.   Neck: Neck supple.   Cardiovascular:  Normal rate and intact distal pulses.            Pulmonary/Chest: Effort normal. No respiratory distress.   Abdominal: Soft. She exhibits no distension. There is no abdominal tenderness. "   Neurological: She is alert and oriented to person, place, and time.   Psychiatric: She has a normal mood and affect. Her behavior is normal. Judgment and thought content normal.         Right wrist:  Sugar-tong splint is in place.  It is clean, dry, intact.  Compartments are compressible.  She is able to flex and extend her digits.  Brisk capillary refill distally.  Sensation to light touch intact distally.      Other extremities are free from any obvious swelling, deformity, open wounds, tenderness over the long bones, pain with range of motion.    Significant Labs: BMP:   Recent Labs   Lab 07/10/23  0103      K 4.2   CO2 24   BUN 49.1*   CREATININE 2.47*   CALCIUM 9.4     CBC:   Recent Labs   Lab 07/10/23  0103   WBC 11.16   HGB 9.4*   HCT 28.8*        All pertinent labs within the past 24 hours have been reviewed.    Significant Imaging: X-Ray: I have reviewed all pertinent results/findings and my personal findings are:  X-rays of the right wrist demonstrate a comminuted distal radius fracture with significant displacement    Assessment/Plan:     Patient has a right comminuted distal radius fracture with significant displacement.  Her injury would benefit from operative stabilization.  We will plan to take her to the operating room later today for open reduction internal fixation of her right distal radius fracture.  We will plan for application of dorsal spanning plate.  She understands that she will not be able to perform range motion of the wrist postoperatively and will have to be nonweightbearing to the right upper extremity.  She may require future operation for hardware removal. NPO for surgery today.     The proposed procedure and associated risks and benefits were discussed with the patient. Risks associated with surgery include but are not limited to pain, bleeding, infection, neurovascular injury, loss of function, need for future surgery, scarring, malunion, nonunion, hardware failure,  loss of limb, and loss of life.    The above findings, diagnosis, and treatment plan were discussed with Dr. Khoi Castaneda who is in agreement.      Thank you for your consult.       SUZETTE Judge  Orthopedics  [facility] - Emergency Dept

## 2023-07-10 NOTE — CONSULTS
"[facility]  Hospital Medicine Consultation Note        Patient Name: Salma Mitchell  MRN: [id number]  Patient Class: OP- Observation   Admission Date: 7/9/2023 10:59 PM  Length of Stay: 0  Attending Physician: Dr. Harman  Primary Care Provider: Jv Mead II, MD  Face-to-Face encounter date: 07/10/2023   Consulting Physician: [facility] Medicine - Dr. Pastor  Reason for Consult: Medical Management  Chief Complaint: Fall (PT SLIPPED ONTO SLIPPERS. TAKES ELIQUIS. PER EMS PT DID NOT HIT HER HEAD FROM THE FALL, PT STATES "I TURNED MY HEAD IN FRUSTRATION FROM FALLING AND BUMPED MY EYEBROW ON THE COUNTER". DEFORMITY TO THE RIGHT WRIST. HEVER SPLINT PER EMS. )        Patient information was obtained from patient, patient's family, past medical records.    HISTORY OF PRESENT ILLNESS:   Salma Mitchell is a 87 y.o. female with PMHx of COPD, HTN, HLD, RONAL, DM 2 who initially presented to [facility] on 7/9/2023 via EMS following a fall.  Pt reportedly fell onto her right side, she really hit her head on the counter during the fall.  She had complaints of right wrist pain.  She was given fentanyl 95 mcg via EMS.     It was reported the patient was on Eliquis, however, patient is unsure whether she is on Eliquis, and states she may have been on it before at some point. Eliquis is not listed on her home medications per review of EMR.     Initial ED VS include /50, HR 62, RR 18, SpO2 97%, temperature 97.7° F.  Labs notable for hemoglobin 9.4, hematocrit 28.8, BUN 49.1, creatinine 2.47, glucose 220.  CXR negative.  CT head without contrast negative for acute intracranial abnormality.  She was noted to have a right wrist dislocation, attempts to reduce under sedation ED or unsuccessful.  She was admitted to trauma services.  Orthopedic surgery was consulted for operative reduction. Hospital Medicine team was consulted for medical management.  Patient apparently was consider nursing home placement prior " to the injury.      PAST MEDICAL HISTORY:   COPD, HTN, HLD, RONAL on CPAP, DM 2    PAST SURGICAL HISTORY:     Past Surgical History:   Procedure Laterality Date    CHOLECYSTECTOMY      HYSTERECTOMY         ALLERGIES:   Adhesive, Center-al house dust, Dairy aid [lactase], Grass pollen-june grass standard, Lexapro [escitalopram], Pcn [penicillins], and Shellfish containing products    FAMILY HISTORY:   Reviewed and negative    SOCIAL HISTORY:   Denied alcohol, tobacco, illicit drug use.  Patient lives at home alone, she has HH. She is ambulatory with a cane/walker. Able to perform ADL's independently.     HOME MEDICATIONS:     Prior to Admission medications    Medication Sig Start Date End Date Taking? Authorizing Provider   ADVAIR DISKUS 250-50 mcg/dose diskus inhaler Inhale 1 puff into the lungs 2 (two) times daily. 5/19/23   Historical Provider   albuterol (PROVENTIL/VENTOLIN HFA) 90 mcg/actuation inhaler Inhale 2 puffs into the lungs every 6 (six) hours as needed for Wheezing. Rescue    Historical Provider   ALPRAZolam (XANAX) 1 MG tablet Take 1 mg by mouth daily as needed.    Historical Provider   amLODIPine (NORVASC) 5 MG tablet Take 5 mg by mouth once daily.    Historical Provider   atorvastatin (LIPITOR) 10 MG tablet Take 10 mg by mouth once daily.    Historical Provider   carvediloL (COREG) 3.125 MG tablet Take 1 tablet (3.125 mg total) by mouth 2 (two) times daily. 5/24/23 5/23/24  Jorge Luis Gross MD   chlorthalidone (HYGROTEN) 25 MG Tab Take 25 mg by mouth once daily. Take 2 tabs    Historical Provider   furosemide (LASIX) 20 MG tablet Take 1 tablet (20 mg total) by mouth once daily. 5/24/23 5/23/24  Jorge Luis Gross MD   gabapentin (NEURONTIN) 300 MG capsule Take 300 mg by mouth 3 (three) times daily.    Historical Provider   glipiZIDE (GLUCOTROL) 10 MG TR24 Take 5 mg by mouth daily with breakfast.    Historical Provider   losartan (COZAAR) 100 MG tablet Take 100 mg by mouth once daily.     Historical Provider   pantoprazole (PROTONIX) 40 MG tablet Take 40 mg by mouth once daily.    Historical Provider   polyethylene glycol (GLYCOLAX) 17 gram PwPk Take 17 g by mouth once daily.    Historical Provider   potassium chloride SA (K-DUR,KLOR-CON M) 10 MEQ tablet Take 10 mEq by mouth once daily. Take 2 tabs    Historical Provider   sertraline (ZOLOFT) 100 MG tablet Take 100 mg by mouth once daily.    Historical Provider       INPATIENT LIST OF MEDICATIONS:     Current Facility-Administered Medications:     acetaminophen tablet 650 mg, 650 mg, Oral, Q8H PRN, Leisa Madrid MD    acetaminophen tablet 650 mg, 650 mg, Oral, Q4H PRN, Leisa Madrid MD    albuterol inhaler 2 puff, 2 puff, Inhalation, Q6H PRN, Leisa Madrid MD    amLODIPine tablet 5 mg, 5 mg, Oral, Daily, Leisa Madrid MD    atorvastatin tablet 10 mg, 10 mg, Oral, Daily, Leisa Madrid MD    carvediloL tablet 3.125 mg, 3.125 mg, Oral, BID, Leisa Madrid MD    chlorthalidone tablet 25 mg, 25 mg, Oral, Daily, Leisa Madrid MD    dextrose 10% bolus 125 mL 125 mL, 12.5 g, Intravenous, PRN, Leisa Madrid MD    dextrose 10% bolus 250 mL 250 mL, 25 g, Intravenous, PRN, Leisa Madrid MD    enoxaparin injection 30 mg, 30 mg, Subcutaneous, Q24H, Leisa Madrid MD, 30 mg at 07/10/23 0504    furosemide tablet 20 mg, 20 mg, Oral, Daily, Leisa Madrid MD    glucagon (human recombinant) injection 1 mg, 1 mg, Intramuscular, PRN, Leisa Madrid MD    insulin aspart U-100 injection 1-10 Units, 1-10 Units, Subcutaneous, Q6H PRN, Leisa Madrid MD    ketamine in 0.9 % sod chloride 50 mg/5 mL (10 mg/mL) injection, , , ,     lactated ringers infusion, , Intravenous, Continuous, Leisa Madrid MD, Last Rate: 100 mL/hr at 07/10/23 0505, New Bag at 07/10/23 0505    LIDOcaine (PF) 10 mg/ml (1%) injection 10 mg, 1 mL, Intradermal, Once PRN,  Leisa Madrid MD    losartan tablet 100 mg, 100 mg, Oral, Daily, Leisa Madrid MD    melatonin tablet 6 mg, 6 mg, Oral, Nightly PRN, Leisa Madrid MD    ondansetron disintegrating tablet 4 mg, 4 mg, Oral, Q6H PRN, Liesa Madrid MD    oxyCODONE immediate release tablet 5 mg, 5 mg, Oral, Q4H PRN, Leisa Madrid MD    oxyCODONE immediate release tablet Tab 10 mg, 10 mg, Oral, Q4H PRN, Leisa Madrid MD    pantoprazole EC tablet 40 mg, 40 mg, Oral, Daily, Leisa Madrid MD    polyethylene glycol packet 17 g, 17 g, Oral, Daily, Leisa Madrid MD    sertraline tablet 100 mg, 100 mg, Oral, Daily, Leisa Madrid MD    sodium chloride 0.9% flush 10 mL, 10 mL, Intravenous, PRN, Leisa Madrid MD    Current Outpatient Medications:     ADVAIR DISKUS 250-50 mcg/dose diskus inhaler, Inhale 1 puff into the lungs 2 (two) times daily., Disp: , Rfl:     albuterol (PROVENTIL/VENTOLIN HFA) 90 mcg/actuation inhaler, Inhale 2 puffs into the lungs every 6 (six) hours as needed for Wheezing. Rescue, Disp: , Rfl:     ALPRAZolam (XANAX) 1 MG tablet, Take 1 mg by mouth daily as needed., Disp: , Rfl:     amLODIPine (NORVASC) 5 MG tablet, Take 5 mg by mouth once daily., Disp: , Rfl:     atorvastatin (LIPITOR) 10 MG tablet, Take 10 mg by mouth once daily., Disp: , Rfl:     carvediloL (COREG) 3.125 MG tablet, Take 1 tablet (3.125 mg total) by mouth 2 (two) times daily., Disp: 60 tablet, Rfl: 11    chlorthalidone (HYGROTEN) 25 MG Tab, Take 25 mg by mouth once daily. Take 2 tabs, Disp: , Rfl:     furosemide (LASIX) 20 MG tablet, Take 1 tablet (20 mg total) by mouth once daily., Disp: 30 tablet, Rfl: 11    gabapentin (NEURONTIN) 300 MG capsule, Take 300 mg by mouth 3 (three) times daily., Disp: , Rfl:     glipiZIDE (GLUCOTROL) 10 MG TR24, Take 5 mg by mouth daily with breakfast., Disp: , Rfl:     losartan (COZAAR) 100 MG tablet, Take 100 mg by mouth once  daily., Disp: , Rfl:     pantoprazole (PROTONIX) 40 MG tablet, Take 40 mg by mouth once daily., Disp: , Rfl:     polyethylene glycol (GLYCOLAX) 17 gram PwPk, Take 17 g by mouth once daily., Disp: , Rfl:     potassium chloride SA (K-DUR,KLOR-CON M) 10 MEQ tablet, Take 10 mEq by mouth once daily. Take 2 tabs, Disp: , Rfl:     sertraline (ZOLOFT) 100 MG tablet, Take 100 mg by mouth once daily., Disp: , Rfl:       Scheduled Meds:   amLODIPine  5 mg Oral Daily    atorvastatin  10 mg Oral Daily    carvediloL  3.125 mg Oral BID    chlorthalidone  25 mg Oral Daily    enoxparin  30 mg Subcutaneous Q24H    furosemide  20 mg Oral Daily    ketamine in 0.9 % sod chloride        losartan  100 mg Oral Daily    pantoprazole  40 mg Oral Daily    polyethylene glycol  17 g Oral Daily    sertraline  100 mg Oral Daily     Continuous Infusions:   lactated ringers 100 mL/hr at 07/10/23 0505     PRN Meds:.acetaminophen, acetaminophen, albuterol, dextrose 10%, dextrose 10%, glucagon (human recombinant), insulin aspart U-100, LIDOcaine (PF) 10 mg/ml (1%), melatonin, ondansetron, oxyCODONE, oxyCODONE, sodium chloride 0.9%    REVIEW OF SYSTEMS:   Except as documented, all other systems reviewed and negative     PHYSICAL EXAM:     VITAL SIGNS: 24 HRS MIN & MAX LAST   Temp  Min: 97.7 °F (36.5 °C)  Max: 98.6 °F (37 °C) 98.6 °F (37 °C)   BP  Min: 118/63  Max: 222/77 (!) 142/89   Pulse  Min: 61  Max: 92  92   Resp  Min: 11  Max: 23 14   SpO2  Min: 93 %  Max: 100 % 95 %       General appearance: Elderly  female in no apparent distress.  HENT: Atraumatic head. Moist mucous membranes of oral cavity.  Eyes: Normal extraocular movements.   Neck: Supple.   Lungs: Clear to auscultation bilaterally.   Heart: Regular rate and rhythm. S1 and S2 present. No pedal edema.  Abdomen: Soft, non-distended, non-tender.  Extremities: R wrist splinted  Skin: No Rash.   Neuro: Motor and sensory exams grossly intact.   Psych/mental status: Appropriate mood and  affect. Responds appropriately to questions.     LABS AND IMAGING:     Recent Labs   Lab 07/10/23  0103   WBC 11.16   RBC 3.27*   HGB 9.4*   HCT 28.8*   MCV 88.1   MCH 28.7   MCHC 32.6*   RDW 16.5      MPV 10.7*       Recent Labs   Lab 07/10/23  0103      K 4.2   CO2 24   BUN 49.1*   CREATININE 2.47*   CALCIUM 9.4   ALBUMIN 3.9   ALKPHOS 114   ALT 10   AST 13   BILITOT 0.5        Microbiology Results (last 7 days)       ** No results found for the last 168 hours. **             RADIOLOGY                                                                                                    CT Head Without Contrast  Narrative: EXAMINATION:  CT HEAD WITHOUT CONTRAST    CLINICAL HISTORY:  Head trauma, minor (Age >= 65y);    TECHNIQUE:  Low dose axial images were obtained through the head.  Coronal and sagittal reformations were also performed. Contrast was not administered.    Automatic exposure control was utilized to reduce the patient's radiation dose.    DLP= 1053    COMPARISON:  None.    FINDINGS:  Hemorrhage: No acute intracranial hemorrhage is seen.    CSF spaces: The ventricles, sulci and basal cisterns all appear moderately prominent global cerebral atrophy.    Brain parenchyma: Moderate microvascular change is seen in portions of the periventricular and deep white matter tracts.    Cerebellum: Unremarkable.    Vascular: Unremarkable venous sinuses. Mild atheromatous calcification of the intracranial arteries is seen.    Sella and skull base: The sella appears to be within normal limits for age.    Cerebellopontine angles: Within normal limits.    Herniation: None.    Intracranial calcifications: Incidental note is made of bilateral choroid plexus calcification. Incidental note is made of some pineal region calcification. Incidental note is made of some calcification of the falx.    Calvarium: No acute linear or depressed skull fracture is seen.    Maxillofacial Structures:    Paranasal sinuses: There  is some mucoperiosteal thickening right maxillary sinuses and bilateral ethmoid air cells. This is consistent with sinusitis. The rest of the paranasal sinuses appear clear.    Orbits: The orbits appear unremarkable.    Zygomatic arches: The zygomatic arches are intact and unremarkable.    Temporal bones and mastoids: The temporal bones and mastoids appear unremarkable.    TMJ: The mandibular condyles appear normally placed with respect to the mandibular fossa.    Nasal Bones: The nasal septum is midline.    Visualized upper cervical spine: The visualized cervical spine appears unremarkable.  Impression: Impression:    1. No acute intracranial traumatic injury identified. Details and findings as noted above.    Electronically signed by: Vaughn Shaw  Date:    07/10/2023  Time:    07:42  X-Ray Chest AP Portable  Narrative: EXAMINATION:  XR CHEST AP PORTABLE    CLINICAL HISTORY:  pre op;    TECHNIQUE:  Single view of the chest    COMPARISON:  05/22/2023    FINDINGS:  No focal opacification, pleural effusion, or pneumothorax.    The cardiomediastinal silhouette is within normal limits.    No acute osseous abnormality.  Impression: No acute cardiopulmonary process.    Electronically signed by: Vaughn Shaw  Date:    07/10/2023  Time:    07:01        ASSESSMENT & PLAN:     Acute right wrist dislocation   Status post ground level fall  ALECIA  Essential hypertension   Type 2 DM with acute hyperglycemia   RONAL on CPAP  HLD    Plan:  Admitted to Trauma Services  Ortho consulted  LR at 100 ml/hr  Resume appropriate home medications once updated  Case management consulted for discharge planning  Labs in AM    Thank you for this consult. We will continue to manage this patients care along side you.     VTE Prophylaxis: Already on Lovenox      I, Mary Lou Rosales NP have reviewed and discussed the case with Dr. Pastor.  Please see the following addendum for further assessment and plan from the attending MD.    Mary Lou MEYER  [facility]  07/10/2023     ______________________________________________________________________________    MD Addendum:      I Dr. Martine Pastor performed substantive portion of the visit, personally performed a face to face evaluation of the patient and have reviewed and agree with NP/PA documentation, treatment and plan & MDM.     87-year-old with above-mentioned medical history presented with rest dislocation after a fall requiring repair.  When seen at bedside patient was alert, comfortably resting, scheduled for OR later in the day.  Right wrist was splinted.  Rest of the exam was benign.  We will review the home medications.  Renewed Eliquis when cleared from orthopedic standpoint.  Continue gentle IV hydration and monitor renal function.  Obtain ALECIA workup.  Continue sliding scale correction.  Check HGB A1c for tomorrow.  Check vitamin-D levels as well.

## 2023-07-10 NOTE — TRANSFER OF CARE
"Anesthesia Transfer of Care Note    Patient: Salma Mitchell    Procedure(s) Performed: Procedure(s) (LRB):  ORIF, FRACTURE, RADIUS (Right)    Patient location: PACU    Anesthesia Type: general    Transport from OR: Transported from OR on room air with adequate spontaneous ventilation    Post pain: adequate analgesia    Post assessment: no apparent anesthetic complications and tolerated procedure well    Post vital signs: stable    Level of consciousness: awake, alert, oriented and responds to stimulation    Nausea/Vomiting: no nausea/vomiting    Complications: none    Transfer of care protocol was followed      Last vitals:   Visit Vitals  BP (!) 163/51   Pulse 82   Temp 37 °C (98.6 °F) (Oral)   Resp 14   Ht 5' 1" (1.549 m)   Wt 97.1 kg (214 lb)   SpO2 98%   BMI 40.43 kg/m²     "

## 2023-07-10 NOTE — OP NOTE
[facility]                       [address] [id number]    PATIENT NAME:      NATALIIA MITCHELL   YOB: 1935  CSN:               [id number]  MRN:               [id number]  ADMIT DATE:        07/09/2023 22:59:00  PHYSICIAN:         Khoi Castaneda MD                          OPERATIVE REPORT      DATE OF SURGERY:    07/10/2023 00:00:00    SURGEON:  Khoi Castaneda MD    PREOPERATIVE DIAGNOSIS:  Right comminuted intra-articular distal radius   fracture.    POSTOPERATIVE DIAGNOSIS:  Right comminuted intra-articular distal radius   fracture.    PROCEDURE:  Open reduction and internal fixation of right intra-articular distal   radius fracture including 3 more fragments.    ANESTHESIA:  General.    ESTIMATED BLOOD LOSS:  30 cc.    TOURNIQUET TIME:  31 minutes.    IMPLANTS:  Biomet distal radius dorsal spanning plate as well as 4.0 mm headless   screws x2 for the intra-articular fragments including her radial styloid as   well as lunate facet fragments.    ASSISTANT:  Michelle Black nurse practitioner, necessary for a skilled set of   hands to assist with reduction of the fracture as well as application of   hardware and deep closure.    COMPLICATIONS:  None.    COUNTS:  All counts were correct x2 at the end of the case.    INDICATIONS FOR PROCEDURE:  Ms. Mitchell is an 87-year-old female who lives   alone at home.  She had a fall in her kitchen and landed onto her right   outstretched upper extremity.  She sustained a comminuted intra-articular   fracture of the distal radius.  She was seen and evaluated in the emergency   department.  She was admitted for placement issues because she is unable to   manage on her own at home.  Orthopedics was consulted for management of her   distal radius fracture.  The risks and benefits of treatment were discussed at   length with the patient.  She is brought to the operating  room for operative   stabilization.    PROCEDURE IN DETAIL:  After informed consent was obtained, the patient was met   in the preoperative holding area and her site was marked.  She was taken to the   operating room.  She was placed supine on the operating table.  General   anesthesia was induced.  All bony prominences were well padded.  Preoperative   antibiotics were given.  The limb was exsanguinated and tourniquet was raised.    An incision was made over the dorsal aspect of the wrist and spread down to the   intra-articular fragment.  She had a lunate facet depressed fragment that was   elevated using a Chester elevator.  She was pulled out to length and multiple   K-wires were used for fixation of her intra-articular fragment.  Fixating the   radial styloid fragment to the lunate facet fragment as well as the radial   styloid fragment to the shaft, providing internal stabilization of the 3   fragments.  A dorsal spanning plate was then applied.  An incision was made over   the second metacarpal and again over the dorsal aspect of the radius and the   plate was slid into position in an extraperiosteal submuscular position. It was   held out to length by my assistant.  The plate was positioned distally with a   nonlocking screw into the shaft of the metacarpal.  Another was placed into the   oblong hole proximally in the radius and was confirmed to be in appropriate   position on AP and lateral imaging.  A combination of locking and nonlocking   screws were used.  Once the dorsal spanning plate was applied, the pins were   confirmed to be in appropriate position on AP and lateral views.  An incision   was made over the pin and carried down to bone.  Two headless 4.0 screws were   placed over the pins, capturing the intra-articular fragments.  They were   confirmed to be in appropriate position on AP and lateral imaging.  Tourniquet   was released.  Hemostasis was obtained.  The wound was irrigated and closed    using a 2-0 Monocryl and 3-0 nylon.  Xeroform and 4x4s cast padding, and a volar   resting splint were applied.  The patient was awakened, extubated, and taken to   recovery in stable condition.    POSTOPERATIVE PLAN:  She will be admitted to the floor.  She will need Case   Management consultation for placement.  She would be nonweightbearing to the   right upper extremity except for ADLs.  Keep splint clean and dry for 2 weeks.    Keep limb elevated.        ______________________________  MD LUPE Cantrell/ALANNA  DD:  07/10/2023  Time:  12:54PM  DT:  07/10/2023  Time:  04:19PM  Job #:  [id number]      OPERATIVE REPORT

## 2023-07-10 NOTE — BRIEF OP NOTE
[facility] - Periop Services  Brief Operative Note    SUMMARY     Surgery Date: 7/10/2023     Surgeon(s) and Role:     * Khoi Castaneda MD - Primary     * SUZETTE Judge    Assisting Surgeon: None    Pre-op Diagnosis:  L distal radius fracture- S52.571A    Post-op Diagnosis:   L distal radius fracture- S52.571A    Procedure(s) (LRB):  ORIF, FRACTURE, RADIUS (Right)- 3 or more fragments    Anesthesia: General    Operative Findings: see op report    Estimated Blood Loss: 20mL    Estimated Blood Loss has been documented.         Specimens:   Specimen (24h ago, onward)      None            [id number]  A/P :Tolerated procedure well. Admit to floor. NWB RUE except for ADLs. CM eval for placement. Ok to resume eliquis. Keep splint c/d/I until f/u in office in 2 weeks.       Khoi Castaneda MD  Orthopedic Trauma  [facility]

## 2023-07-10 NOTE — ANESTHESIA PREPROCEDURE EVALUATION
"                                                                                                             07/10/2023  Salma Mitchell is a 87 y.o., female.    Pre-op Diagnosis: Trauma [T14.90XA]    Procedure(s): ORIF, FRACTURE, RADIUS     Chief Complaint/Reason for Admission: Ground level fall, right wrist dislocation   HPI: 88yo F (??w/ hx of afib on eliquis??), HTN, HLD, DM, COPD who presents s/p ground level fall earlier today. She was turning around in her kitchen when her "legs came out from under her". She braced herself with her right wrist and thinks she "bumped" her eyebrow on the countertop. Denies LOC. Only complains of wrist pain. GCS 15, HDS. Right wrist noted to have dislocation. Attempts to reduce under sedation in ED were unsuccessful. Denies tobacco, alcohol, or illicit drug use.      Review of patient's allergies indicates:   Allergen Reactions    Adhesive     Center-al house dust     Dairy aid [lactase]     Grass pollen-june grass standard     Lexapro [escitalopram]     Pcn [penicillins]     Shellfish containing products        Current Outpatient Medications   Medication Instructions    ADVAIR DISKUS 250-50 mcg/dose diskus inhaler 1 puff, Inhalation, 2 times daily    albuterol (PROVENTIL/VENTOLIN HFA) 90 mcg/actuation inhaler 2 puffs, Inhalation, Every 6 hours PRN, Rescue    ALPRAZolam (XANAX) 1 mg, Oral, Daily PRN    amLODIPine (NORVASC) 5 mg, Oral, Daily    atorvastatin (LIPITOR) 10 mg, Oral, Daily    carvediloL (COREG) 3.125 mg, Oral, 2 times daily    chlorthalidone (HYGROTEN) 25 mg, Oral, Daily, Take 2 tabs    furosemide (LASIX) 20 mg, Oral, Daily    gabapentin (NEURONTIN) 300 mg, Oral, 3 times daily    glipiZIDE (GLUCOTROL) 5 mg, Oral, With breakfast    losartan (COZAAR) 100 mg, Oral, Daily    pantoprazole (PROTONIX) 40 mg, Oral, Daily    polyethylene glycol (GLYCOLAX) 17 g, Oral, Daily    potassium chloride SA (K-DUR,KLOR-CON M) 10 MEQ tablet 10 mEq, Oral, Daily, Take " "2 tabs    sertraline (ZOLOFT) 100 mg, Oral, Daily   Eliquis is questionable (patient not sure that she is on it)      Past Medical History:   Diagnosis Date    Asthma     Diabetes mellitus     HLD (hyperlipidemia)     Hypertension     RONAL (obstructive sleep apnea)     SOB (shortness of breath)     Unspecified cataract    Atrial fibrillation on resident's history; patient says heart " issue in past, not sure"  PMH includes CKD; h/o blood transfusion for anemia    Past Surgical History:   Procedure Laterality Date    CHOLECYSTECTOMY      HYSTERECTOMY       Recent Labs   Lab 07/10/23  0103   WBC 11.16   RBC 3.27*   HGB 9.4*   HCT 28.8*   MCV 88.1   MCH 28.7   MCHC 32.6*   RDW 16.5      MPV 10.7*       Recent Labs   Lab 07/10/23  0103      K 4.2   CO2 24   BUN 49.1*   CREATININE 2.47*   CALCIUM 9.4   ALBUMIN 3.9   ALKPHOS 114   ALT 10   AST 13   BILITOT 0.5     5/23/2023 TTE   Summary   Moderate concentric hypertrophy and normal systolic function.   The estimated ejection fraction is 55%.   Grade II left ventricular diastolic dysfunction.   Normal right ventricular size with normal right ventricular systolic function.   There is mild aortic valve stenosis.   Aortic valve area is 1.25 cm2; peak velocity is 2.73 m/s; mean gradient is 16 mmHg.   Mild tricuspid regurgitation.   Normal central venous pressure (3 mmHg).   The estimated PA systolic pressure is 28 mmHg.   There is no pulmonary hypertension.       Recent Labs   Lab 07/10/23  0103   PTT 29.8   INR 1.06       Pre-op Assessment    I have reviewed the Patient Summary Reports.    I have reviewed the NPO Status.   I have reviewed the Medications.     Review of Systems  Anesthesia Hx:  No problems with previous Anesthesia  Denies Family Hx of Anesthesia complications.   Denies Personal Hx of Anesthesia complications.   Social:  Non-Smoker    Cardiovascular:   Exercise tolerance: good Hypertension  Denies Angina.  Denies Orthopnea.  " Denies PND. hyperlipidemia  Denies DEMPSEY.  Functional Capacity good / => 4 METS    Pulmonary:   Asthma Shortness of breath Sleep Apnea    Renal/:   Chronic Renal Disease, CKD    Musculoskeletal:  Musculoskeletal Normal    Neurological:   Denies TIA. Denies CVA.    Endocrine:   Diabetes    Psych:  Psychiatric Normal           Physical Exam  General: Well nourished, Alert and Oriented    Airway:  Mallampati: III   Mouth Opening: Normal  TM Distance: Normal  Tongue: Normal  Neck ROM: Normal ROM    Dental:  Edentulous    Chest/Lungs:  Clear to auscultation    Heart:  Rate: Normal  Rhythm: Regular Rhythm  Murmur: Systolic;  Systolic: R Upper Sternal Border, Grade III;  No pretibial edema  Heart murmur radiates to R carotid      Anesthesia Plan  Type of Anesthesia, risks & benefits discussed:    Anesthesia Type: Gen ETT  Intra-op Monitoring Plan: Standard ASA Monitors  Post Op Pain Control Plan: multimodal analgesia  Induction:  IV  Airway Plan: Direct, Post-Induction  Informed Consent: Informed consent signed with the Patient and all parties understand the risks and agree with anesthesia plan.  All questions answered. Patient consented to blood products? Yes  ASA Score: 3  Day of Surgery Review of History & Physical: H&P Update referred to the surgeon/provider.    Ready For Surgery From Anesthesia Perspective.     .

## 2023-07-10 NOTE — ANESTHESIA POSTPROCEDURE EVALUATION
Anesthesia Post Evaluation    Patient: Salma Mitchell    Procedure(s) Performed: Procedure(s) (LRB):  ORIF, FRACTURE, RADIUS (Right)    Final Anesthesia Type: general      Patient location during evaluation: PACU  Patient participation: Yes- Able to Participate  Level of consciousness: awake and alert and oriented  Post-procedure vital signs: reviewed and stable  Pain management: adequate  Airway patency: patent    PONV status at discharge: No PONV  Anesthetic complications: no      Cardiovascular status: hemodynamically stable  Respiratory status: unassisted  Hydration status: euvolemic  Follow-up not needed.          Vitals Value Taken Time   /51 07/10/23 1322   Temp 36.8 07/10/23 1337   Pulse 82 07/10/23 1337   Resp 18 07/10/23 1337   SpO2 97 % 07/10/23 1337   Vitals shown include unvalidated device data.      No case tracking events are documented in the log.      Pain/Guerda Score: Pain Rating Prior to Med Admin: 10 (7/10/2023 12:00 AM)  Guerda Score: 9 (7/10/2023 12:45 AM)

## 2023-07-11 LAB
ANION GAP SERPL CALC-SCNC: 13 MEQ/L
ANION GAP SERPL CALC-SCNC: 13 MEQ/L
BASOPHILS # BLD AUTO: 0.02 X10(3)/MCL
BASOPHILS NFR BLD AUTO: 0.2 %
BUN SERPL-MCNC: 47.8 MG/DL (ref 9.8–20.1)
BUN SERPL-MCNC: 56.9 MG/DL (ref 9.8–20.1)
CALCIUM SERPL-MCNC: 9.1 MG/DL (ref 8.4–10.2)
CALCIUM SERPL-MCNC: 9.2 MG/DL (ref 8.4–10.2)
CHLORIDE SERPL-SCNC: 101 MMOL/L (ref 98–107)
CHLORIDE SERPL-SCNC: 103 MMOL/L (ref 98–107)
CO2 SERPL-SCNC: 22 MMOL/L (ref 23–31)
CO2 SERPL-SCNC: 24 MMOL/L (ref 23–31)
CREAT SERPL-MCNC: 2.71 MG/DL (ref 0.55–1.02)
CREAT SERPL-MCNC: 3.31 MG/DL (ref 0.55–1.02)
CREAT/UREA NIT SERPL: 17
CREAT/UREA NIT SERPL: 18
DEPRECATED CALCIDIOL+CALCIFEROL SERPL-MC: 38.8 NG/ML (ref 30–80)
EOSINOPHIL # BLD AUTO: 0 X10(3)/MCL (ref 0–0.9)
EOSINOPHIL NFR BLD AUTO: 0 %
ERYTHROCYTE [DISTWIDTH] IN BLOOD BY AUTOMATED COUNT: 16.9 % (ref 11.5–17)
EST. AVERAGE GLUCOSE BLD GHB EST-MCNC: 128.4 MG/DL
GFR SERPLBLD CREATININE-BSD FMLA CKD-EPI: 13 MLS/MIN/1.73/M2
GFR SERPLBLD CREATININE-BSD FMLA CKD-EPI: 17 MLS/MIN/1.73/M2
GLUCOSE SERPL-MCNC: 175 MG/DL (ref 82–115)
GLUCOSE SERPL-MCNC: 237 MG/DL (ref 82–115)
HBA1C MFR BLD: 6.1 %
HCT VFR BLD AUTO: 28.7 % (ref 37–47)
HGB BLD-MCNC: 8.9 G/DL (ref 12–16)
IMM GRANULOCYTES # BLD AUTO: 0.08 X10(3)/MCL (ref 0–0.04)
IMM GRANULOCYTES NFR BLD AUTO: 0.7 %
LYMPHOCYTES # BLD AUTO: 1.36 X10(3)/MCL (ref 0.6–4.6)
LYMPHOCYTES NFR BLD AUTO: 11.4 %
MCH RBC QN AUTO: 27.7 PG (ref 27–31)
MCHC RBC AUTO-ENTMCNC: 31 G/DL (ref 33–36)
MCV RBC AUTO: 89.4 FL (ref 80–94)
MONOCYTES # BLD AUTO: 1.46 X10(3)/MCL (ref 0.1–1.3)
MONOCYTES NFR BLD AUTO: 12.2 %
NEUTROPHILS # BLD AUTO: 9.03 X10(3)/MCL (ref 2.1–9.2)
NEUTROPHILS NFR BLD AUTO: 75.5 %
NRBC BLD AUTO-RTO: 0 %
PLATELET # BLD AUTO: 248 X10(3)/MCL (ref 130–400)
PMV BLD AUTO: 10.8 FL (ref 7.4–10.4)
POCT GLUCOSE: 168 MG/DL (ref 70–110)
POCT GLUCOSE: 192 MG/DL (ref 70–110)
POCT GLUCOSE: 206 MG/DL (ref 70–110)
POTASSIUM SERPL-SCNC: 4.4 MMOL/L (ref 3.5–5.1)
POTASSIUM SERPL-SCNC: 4.5 MMOL/L (ref 3.5–5.1)
RBC # BLD AUTO: 3.21 X10(6)/MCL (ref 4.2–5.4)
SODIUM SERPL-SCNC: 138 MMOL/L (ref 136–145)
SODIUM SERPL-SCNC: 138 MMOL/L (ref 136–145)
WBC # SPEC AUTO: 11.95 X10(3)/MCL (ref 4.5–11.5)

## 2023-07-11 PROCEDURE — 27000221 HC OXYGEN, UP TO 24 HOURS

## 2023-07-11 PROCEDURE — 82306 VITAMIN D 25 HYDROXY: CPT | Performed by: INTERNAL MEDICINE

## 2023-07-11 PROCEDURE — 97162 PT EVAL MOD COMPLEX 30 MIN: CPT

## 2023-07-11 PROCEDURE — 80048 BASIC METABOLIC PNL TOTAL CA: CPT | Performed by: STUDENT IN AN ORGANIZED HEALTH CARE EDUCATION/TRAINING PROGRAM

## 2023-07-11 PROCEDURE — 85025 COMPLETE CBC W/AUTO DIFF WBC: CPT | Performed by: STUDENT IN AN ORGANIZED HEALTH CARE EDUCATION/TRAINING PROGRAM

## 2023-07-11 PROCEDURE — 63600175 PHARM REV CODE 636 W HCPCS: Performed by: STUDENT IN AN ORGANIZED HEALTH CARE EDUCATION/TRAINING PROGRAM

## 2023-07-11 PROCEDURE — 21400001 HC TELEMETRY ROOM

## 2023-07-11 PROCEDURE — 83036 HEMOGLOBIN GLYCOSYLATED A1C: CPT | Performed by: INTERNAL MEDICINE

## 2023-07-11 PROCEDURE — 63600175 PHARM REV CODE 636 W HCPCS: Performed by: NURSE PRACTITIONER

## 2023-07-11 PROCEDURE — 97166 OT EVAL MOD COMPLEX 45 MIN: CPT

## 2023-07-11 PROCEDURE — 25000003 PHARM REV CODE 250: Performed by: STUDENT IN AN ORGANIZED HEALTH CARE EDUCATION/TRAINING PROGRAM

## 2023-07-11 PROCEDURE — 25000003 PHARM REV CODE 250: Performed by: INTERNAL MEDICINE

## 2023-07-11 PROCEDURE — A4216 STERILE WATER/SALINE, 10 ML: HCPCS | Performed by: STUDENT IN AN ORGANIZED HEALTH CARE EDUCATION/TRAINING PROGRAM

## 2023-07-11 PROCEDURE — 36410 VNPNXR 3YR/> PHY/QHP DX/THER: CPT

## 2023-07-11 PROCEDURE — 94761 N-INVAS EAR/PLS OXIMETRY MLT: CPT

## 2023-07-11 PROCEDURE — 80048 BASIC METABOLIC PNL TOTAL CA: CPT | Performed by: INTERNAL MEDICINE

## 2023-07-11 RX ORDER — SODIUM CHLORIDE 9 MG/ML
INJECTION, SOLUTION INTRAVENOUS CONTINUOUS
Status: DISCONTINUED | OUTPATIENT
Start: 2023-07-11 | End: 2023-07-13

## 2023-07-11 RX ADMIN — ATORVASTATIN CALCIUM 10 MG: 10 TABLET, FILM COATED ORAL at 09:07

## 2023-07-11 RX ADMIN — MORPHINE SULFATE 2 MG: 4 INJECTION INTRAVENOUS at 04:07

## 2023-07-11 RX ADMIN — SODIUM CHLORIDE: 9 INJECTION, SOLUTION INTRAVENOUS at 10:07

## 2023-07-11 RX ADMIN — OXYCODONE HYDROCHLORIDE 10 MG: 10 TABLET ORAL at 05:07

## 2023-07-11 RX ADMIN — OXYCODONE HYDROCHLORIDE 10 MG: 10 TABLET ORAL at 12:07

## 2023-07-11 RX ADMIN — CARVEDILOL 3.12 MG: 3.12 TABLET, FILM COATED ORAL at 09:07

## 2023-07-11 RX ADMIN — INSULIN ASPART 2 UNITS: 100 INJECTION, SOLUTION INTRAVENOUS; SUBCUTANEOUS at 12:07

## 2023-07-11 RX ADMIN — ENOXAPARIN SODIUM 30 MG: 30 INJECTION SUBCUTANEOUS at 04:07

## 2023-07-11 RX ADMIN — SERTRALINE HYDROCHLORIDE 100 MG: 50 TABLET ORAL at 09:07

## 2023-07-11 RX ADMIN — SODIUM CHLORIDE: 9 INJECTION, SOLUTION INTRAVENOUS at 09:07

## 2023-07-11 RX ADMIN — PANTOPRAZOLE SODIUM 40 MG: 40 TABLET, DELAYED RELEASE ORAL at 09:07

## 2023-07-11 RX ADMIN — Medication 10 ML: at 05:07

## 2023-07-11 RX ADMIN — OXYCODONE HYDROCHLORIDE 10 MG: 10 TABLET ORAL at 06:07

## 2023-07-11 RX ADMIN — CEFAZOLIN SODIUM 2 G: 2 SOLUTION INTRAVENOUS at 04:07

## 2023-07-11 RX ADMIN — AMLODIPINE BESYLATE 5 MG: 5 TABLET ORAL at 09:07

## 2023-07-11 NOTE — PROGRESS NOTES
[facility]  Hospital Medicine Progress Note        Chief Complaint: Inpatient Follow-up for     HPI: 87 y.o. female with PMHx of COPD, HTN, HLD, RONAL, DM 2 who initially presented to [facility] on 7/9/2023 via EMS following a fall.  Pt reportedly fell onto her right side, she really hit her head on the counter during the fall.  She had complaints of right wrist pain.  She was given fentanyl 95 mcg via EMS.      It was reported the patient was on Eliquis, however, patient is unsure whether she is on Eliquis, and states she may have been on it before at some point. Eliquis is not listed on her home medications per review of EMR.      Initial ED VS include /50, HR 62, RR 18, SpO2 97%, temperature 97.7° F.  Labs notable for hemoglobin 9.4, hematocrit 28.8, BUN 49.1, creatinine 2.47, glucose 220.  CXR negative.  CT head without contrast negative for acute intracranial abnormality.  She was noted to have a right wrist dislocation, attempts to reduce under sedation ED or unsuccessful.  She was admitted to trauma services.  Orthopedic surgery was consulted for operative reduction. Hospital Medicine team was consulted for medical management.  Patient apparently was consider nursing home placement prior to the injury.    Interval Hx:     Patient seen and examined this morning creatinine is trending up.    Objective/physical exam:  General: In no acute distress, afebrile  Chest: Clear to auscultation bilaterally  Heart: RRR, +S1, S2, no appreciable murmur  Abdomen: Soft, nontender, BS +  MSK: Warm, no lower extremity edema, no clubbing or cyanosis  Neurologic: Alert and oriented x4, Cranial nerve II-XII intact, Strength 5/5 in all 4 extremities    VITAL SIGNS: 24 HRS MIN & MAX LAST   Temp  Min: 98.4 °F (36.9 °C)  Max: 99.5 °F (37.5 °C) 99.5 °F (37.5 °C)   BP  Min: 89/66  Max: 187/70 (!) 141/53   Pulse  Min: 72  Max: 93  88   Resp  Min: 14  Max: 24 20   SpO2  Min: 90 %  Max: 98 % (!) 91 %     I  have reviewed the following labs:    Recent Labs   Lab 07/10/23  0103 07/11/23  0408   WBC 11.16 11.95*   RBC 3.27* 3.21*   HGB 9.4* 8.9*   HCT 28.8* 28.7*   MCV 88.1 89.4   MCH 28.7 27.7   MCHC 32.6* 31.0*   RDW 16.5 16.9    248   MPV 10.7* 10.8*       Recent Labs   Lab 07/10/23  0103 07/11/23  0408    138   K 4.2 4.5   CO2 24 24   BUN 49.1* 56.9*   CREATININE 2.47* 3.31*   CALCIUM 9.4 9.1   ALBUMIN 3.9  --    ALKPHOS 114  --    ALT 10  --    AST 13  --    BILITOT 0.5  --           Microbiology Results (last 7 days)       ** No results found for the last 168 hours. **             See below for Radiology    Scheduled Med:   amLODIPine  5 mg Oral Daily    atorvastatin  10 mg Oral Daily    carvediloL  3.125 mg Oral BID    enoxparin  30 mg Subcutaneous Q24H    pantoprazole  40 mg Oral Daily    polyethylene glycol  17 g Oral Daily    sertraline  100 mg Oral Daily    sodium chloride 0.9%  10 mL Intravenous Q6H        Continuous Infusions:   sodium chloride 0.9% 75 mL/hr at 07/11/23 0936        PRN Meds:  acetaminophen, acetaminophen, albuterol, dextrose 10%, dextrose 10%, glucagon (human recombinant), insulin aspart U-100, LIDOcaine (PF) 10 mg/ml (1%), melatonin, morphine, ondansetron, oxyCODONE, oxyCODONE, Flushing PICC Protocol **AND** sodium chloride 0.9% **AND** sodium chloride 0.9%       Assessment/Plan:  Acute kidney injury   Fall   Right distal radius fracture status post open reduction and internal fixation Essential hypertension  Diabetes mellitus type 2 with hyperglycemia  RONAL on CPAP   Hyperlipidemia    We will bladder scan, discontinued losartan, metolazone, Lasix started on IV fluids at 75 cc an hour repeat BMP later today if creatinine is still trending up then we might consult Nephrology   Will order retroperitoneal ultrasound   Patient is status post open reduction and internal fixation Orthopedics following   Will consult PT and OT   Repeat blood work in a.m.    VTE prophylaxis:  lovenox    Patient condition:  Stable/Fair/Guarded/ Serious/ Critical    Anticipated discharge and Disposition:         All diagnosis and differential diagnosis have been reviewed; assessment and plan has been documented; I have personally reviewed the labs and test results that are presently available; I have reviewed the patients medication list; I have reviewed the consulting providers response and recommendations. I have reviewed or attempted to review medical records based upon their availability    All of the patient's questions have been  addressed and answered. Patient's is agreeable to the above stated plan. I will continue to monitor closely and make adjustments to medical management as needed.  _____________________________________________________________________    Nutrition Status:    Radiology:  I have personally reviewed the following imaging and agree with the radiologist.     US Retroperitoneal Limited  Narrative: EXAMINATION:  US RETROPERITONEAL LIMITED    CLINICAL HISTORY:  ALECIA;    TECHNIQUE:  Multiple sonographic images the kidneys were obtained by department sonographer.    COMPARISON:  None    FINDINGS:  The left kidney is not identified secondary to overlying bowel gas.  The right kidney measures 7.4 cm in length.  No evidence for hydronephrosis or calculus.  The parenchyma demonstrates increased echogenicity with cortical thinning.  Impression: Nonvisualized left kidney.  Likely medical renal disease of the right kidney without evidence for obstructive uropathy.    Electronically signed by: Faizan Herrera MD  Date:    07/10/2023  Time:    21:36  X-Ray Wrist Complete Right  Narrative: EXAMINATION:  Right wrist three views    CLINICAL HISTORY:  Postop evaluation    COMPARISON:  07/10/2023    FINDINGS:  There has been interval open reduction internal fixation noted.  There are 2 screws fixating the distal radius intra-articular fracture now demonstrating near anatomic alignment.  There is dorsal  plate and screws extending from the mid radius to 2nd metacarpal.  The hardware appears intact.  The foot is placed ulnar styloid fracture is unchanged.  Impression: Interval postop changes without hardware complication or failure.  Comminuted distal radius intra-articular fracture is in near anatomic alignment.    Electronically signed by: Brain Garcia MD  Date:    07/10/2023  Time:    14:14  SURG FL Surgery Fluoro Usage  See OP Notes for results.     IMPRESSION: See OP Notes for results.     This procedure was auto-finalized by: Virtual Radiologist  X-Ray Wrist 2 View Right  EXAMINATION  XR WRIST 2 VIEW RIGHT    CLINICAL HISTORY  post reduction ; Pain, unspecified    TECHNIQUE  A total of 2 images submitted of the right wrist.    COMPARISON  9 July 2023    FINDINGS  Interval splinting of the forearm/wrist noted; associated artifact obscures fine osseous detail.  There is no significant interval change in the grossly displaced intra-articular distal radial fracture.  No definite newly displaced cortical injury is identified.  Visualized joints are of similar alignment.  Ulnar styloid fracture is again suspected, without clear visualization of the displaced fragment.    Regional soft tissues are unchanged.    IMPRESSION  Interval splinting with no significant change in fracture alignment.    Electronically signed by: Aren Hall  Date:    07/10/2023  Time:    12:50  X-Ray Wrist Complete Right  EXAMINATION  XR WRIST COMPLETE 3 VIEWS RIGHT    CLINICAL HISTORY  Pain, unspecified    TECHNIQUE  A total of 3 images submitted of the right wrist.    COMPARISON  None available at the time of initial interpretation.    FINDINGS  Acute comminuted fracture with intra-articular extension noted at the distal radial metaphysis.  There is significant lateral and posterior displacement greater than 1 shaft-width.  The displaced distal radial fragment remains well aligned with the carpal structures, although overall gross lateral  subluxation of the wrist is evident relative to the distal radial and ulnar shafts.  Ulnar styloid fracture is also suspected, with the fragment component not clearly visualized.    Widespread soft tissue swelling and skin contour deformity noted.  No tracking subcutaneous gas or radiopaque foreign body.    IMPRESSION  1. Acute, markedly displaced distal radial fracture.  2. Cannot exclude ulnar styloid fracture.    Electronically signed by: Aren Hall  Date:    07/10/2023  Time:    12:47  CT Head Without Contrast  Narrative: EXAMINATION:  CT HEAD WITHOUT CONTRAST    CLINICAL HISTORY:  Head trauma, minor (Age >= 65y);    TECHNIQUE:  Low dose axial images were obtained through the head.  Coronal and sagittal reformations were also performed. Contrast was not administered.    Automatic exposure control was utilized to reduce the patient's radiation dose.    DLP= 1053    COMPARISON:  None.    FINDINGS:  Hemorrhage: No acute intracranial hemorrhage is seen.    CSF spaces: The ventricles, sulci and basal cisterns all appear moderately prominent global cerebral atrophy.    Brain parenchyma: Moderate microvascular change is seen in portions of the periventricular and deep white matter tracts.    Cerebellum: Unremarkable.    Vascular: Unremarkable venous sinuses. Mild atheromatous calcification of the intracranial arteries is seen.    Sella and skull base: The sella appears to be within normal limits for age.    Cerebellopontine angles: Within normal limits.    Herniation: None.    Intracranial calcifications: Incidental note is made of bilateral choroid plexus calcification. Incidental note is made of some pineal region calcification. Incidental note is made of some calcification of the falx.    Calvarium: No acute linear or depressed skull fracture is seen.    Maxillofacial Structures:    Paranasal sinuses: There is some mucoperiosteal thickening right maxillary sinuses and bilateral ethmoid air cells. This is  consistent with sinusitis. The rest of the paranasal sinuses appear clear.    Orbits: The orbits appear unremarkable.    Zygomatic arches: The zygomatic arches are intact and unremarkable.    Temporal bones and mastoids: The temporal bones and mastoids appear unremarkable.    TMJ: The mandibular condyles appear normally placed with respect to the mandibular fossa.    Nasal Bones: The nasal septum is midline.    Visualized upper cervical spine: The visualized cervical spine appears unremarkable.  Impression: Impression:    1. No acute intracranial traumatic injury identified. Details and findings as noted above.    Electronically signed by: Vaughn Shaw  Date:    07/10/2023  Time:    07:42  X-Ray Chest AP Portable  Narrative: EXAMINATION:  XR CHEST AP PORTABLE    CLINICAL HISTORY:  pre op;    TECHNIQUE:  Single view of the chest    COMPARISON:  05/22/2023    FINDINGS:  No focal opacification, pleural effusion, or pneumothorax.    The cardiomediastinal silhouette is within normal limits.    No acute osseous abnormality.  Impression: No acute cardiopulmonary process.    Electronically signed by: Vaughn Shaw  Date:    07/10/2023  Time:    07:01      Rubi Stein MD   07/11/2023

## 2023-07-11 NOTE — PLAN OF CARE
Plan of Care         We are transferring care of Ms. Salma Mitchell to the Medical Hospitalists. Dr. Stein has assumed care at 0840. No further trauma/surgical interventions indicated at this time. We have ordered Lovenox based on trauma dosing and would strongly recommend continuing this dose throughout the patients hospital stay. The patient is at high risk given patient age and recent trauma. We appreciate the assistance. Thank you for assistance with the medical care of Salma Mitchell .

## 2023-07-11 NOTE — PT/OT/SLP EVAL
Occupational Therapy  Evaluation    Name: Salma Mitchell  MRN: [id number]  Admitting Diagnosis: Fall: R CMC dislocation, closed fx of distal end of R radius, s/p ORIF of R intra-articular distal radius fx  Recent Surgery: Procedure(s) (LRB):  ORIF, FRACTURE, RADIUS (Right) 1 Day Post-Op    Recommendations:     Discharge Recommendations: rehabilitation facility, nursing facility, skilled  Discharge Equipment Recommendations:  bedside commode  Barriers to discharge:  Decreased caregiver support    Assessment:     Salma Mitchell is a 87 y.o. female with a medical diagnosis of Fall: R CMC dislocation, closed fx of distal end of R radius, s/p ORIF of R intra-articular distal radius fx. Performance deficits affecting function: weakness, impaired cognition, orthopedic precautions, impaired self care skills.  Pt agreed to participate in therapy today. Pt appeared AOx1. Pt demonstrated trouble c word finding and repeating her story from her fall throughout session. Pt would benefit from rehab vs SNF placement upon d/c.    Rehab Prognosis: Good; patient would benefit from acute skilled OT services to address these deficits and reach maximum level of function.       Plan:     Patient to be seen 5 x/week to address the above listed problems via self-care/home management, therapeutic activities, therapeutic exercises  Plan of Care Expires: 07/25/23  Plan of Care Reviewed with: patient    Subjective     Chief Complaint: pain    Occupational Profile: limited information gathered d/t [facility]  Living Environment: pt lives alone in an apt on ground level. Pt reported her son lives close by.   Previous level of function: pt reported she completed ADLs independently, but sponge bathed herself at home. Pt confused when she reported sponge bathing. Pt reported she mobilizes c RW  Equipment Used at Home: walker, rolling    Pain/Comfort:  Pain Rating 1: 8/10  Location - Side 1: Right  Location 1: arm    Patients cultural, spiritual, Church  conflicts given the current situation: no    Objective:     Communicated with: RN prior to session.  Patient found up in chair with peripheral IV, telemetry upon OT entry to room.    General Precautions: Standard, fall  Orthopedic Precautions: RUE non weight bearing (NWB except for ADLs. No ROM of wrist)  Braces: N/A  Respiratory Status: Room air    Occupational Performance:    Functional Mobility/Transfers:  Patient completed Sit <> Stand Transfer with minimum assistance  with  platform walker   Patient completed Toilet Transfer Step Transfer technique to bsc with minimum assistance with  platform walker  Functional Mobility: Pt demonstrated no LOB when mobilizing. Pt took 2-3 steps to bsc     Activities of Daily Living:  Lower Body Dressing: total assistance total A to heather socks  Toileting: maximal assistance max A for hygiene after urination    Cognitive/Visual Perceptual:  Cognitive/Psychosocial Skills:     -       Oriented to: Situation. Pt repeated the story about her fall multiple times throughout session  -       Follows Commands/attention:Follows one-step commands  -       Safety awareness/insight to disability: intact   -       Mood/Affect/Coping skills/emotional control: Appropriate to situation and Cooperative    Physical Exam:  Upper Extremity Range of Motion:     -       Right Upper Extremity: PROM performed of all extremities   -       Left Upper Extremity: WFL  Upper Extremity Strength:    -       Left Upper Extremity: WFL    Therapeutic Positioning  Risk for acquired pressure injuries is decreased due to ability to get to BSC/toilet with assist.      Patient Education:  Patient provided with verbal education regarding OT role/goals/POC, post op precautions, fall prevention, safety awareness, and Discharge/DME recommendations.  Understanding was verbalized, however additional teaching warranted.     Patient left up in chair with all lines intact and call button in reach    GOALS:   Multidisciplinary  Problems       Occupational Therapy Goals          Problem: Occupational Therapy    Goal Priority Disciplines Outcome Interventions   Occupational Therapy Goal     OT, PT/OT Ongoing, Progressing    Description: Goals to be met by: 7/25/23     Patient will increase functional independence with ADLs by performing:    UE Dressing with Stand-by Assistance.  LE Dressing with Stand-by Assistance.  Grooming while standing at sink with Stand-by Assistance.  Toileting from bedside commode with Stand-by Assistance for hygiene and clothing management.   Toilet transfer to bedside commode with Stand-by Assistance.                         History:     Past Medical History:   Diagnosis Date    Asthma     Diabetes mellitus     HLD (hyperlipidemia)     Hypertension     RONAL (obstructive sleep apnea)     SOB (shortness of breath)     Unspecified cataract          Past Surgical History:   Procedure Laterality Date    CHOLECYSTECTOMY      HYSTERECTOMY      OPEN REDUCTION AND INTERNAL FIXATION (ORIF) OF FRACTURE OF RADIUS Right 7/10/2023    Procedure: ORIF, FRACTURE, RADIUS;  Surgeon: Khoi Castaneda MD;  Location: [facility];  Service: Orthopedics;  Laterality: Right;  DISTAL RADIUS, SUPINE, ARM TABLE, TOURNIQUET, BIOMET--YEMI JAVIER       Time Tracking:     OT Date of Treatment:    OT Start Time: 1107  OT Stop Time: 1130  OT Total Time (min): 23 min    Billable Minutes:Evaluation moderate complexity    7/11/2023

## 2023-07-11 NOTE — TERTIARY TRAUMA SURVEY NOTE
TERTIARY TRAUMA SURVEY (TTS)    List Injuries Identified to Date:   1. Displaced distal radial fracture    List Operations and Procedures:   1. Cholecystectomy  2. Hysterectomy    Past Surgical History:   Procedure Laterality Date    CHOLECYSTECTOMY      HYSTERECTOMY         Past Medical History:   1. HTN  2. HLD  3. DM  4. COPD  5. Afib    Active Ambulatory Problems     Diagnosis Date Noted    Shortness of breath 05/23/2023     Resolved Ambulatory Problems     Diagnosis Date Noted    No Resolved Ambulatory Problems     Past Medical History:   Diagnosis Date    Asthma     Diabetes mellitus     HLD (hyperlipidemia)     Hypertension     RONAL (obstructive sleep apnea)     SOB (shortness of breath)     Unspecified cataract      Past Medical History:   Diagnosis Date    Asthma     Diabetes mellitus     HLD (hyperlipidemia)     Hypertension     RONAL (obstructive sleep apnea)     SOB (shortness of breath)     Unspecified cataract        Tertiary Physical Exam:     Physical Exam    Imaging Review:     Imaging Results              X-Ray Wrist 2 View Right (Final result)  Result time 07/10/23 12:50:28   Procedure changed from X-Ray Wrist Complete Right     Final result by Aren Hall MD (07/10/23 12:50:28)                   Narrative:    EXAMINATION  XR WRIST 2 VIEW RIGHT    CLINICAL HISTORY  post reduction ; Pain, unspecified    TECHNIQUE  A total of 2 images submitted of the right wrist.    COMPARISON  9 July 2023    FINDINGS  Interval splinting of the forearm/wrist noted; associated artifact obscures fine osseous detail.  There is no significant interval change in the grossly displaced intra-articular distal radial fracture.  No definite newly displaced cortical injury is identified.  Visualized joints are of similar alignment.  Ulnar styloid fracture is again suspected, without clear visualization of the displaced fragment.    Regional soft tissues are unchanged.    IMPRESSION  Interval splinting with no significant  change in fracture alignment.      Electronically signed by: Aren Hall  Date:    07/10/2023  Time:    12:50                                     X-Ray Chest AP Portable (Final result)  Result time 07/10/23 07:01:22      Final result by Vaughn Shaw MD (07/10/23 07:01:22)                   Impression:      No acute cardiopulmonary process.      Electronically signed by: Vaughn Shaw  Date:    07/10/2023  Time:    07:01               Narrative:    EXAMINATION:  XR CHEST AP PORTABLE    CLINICAL HISTORY:  pre op;    TECHNIQUE:  Single view of the chest    COMPARISON:  05/22/2023    FINDINGS:  No focal opacification, pleural effusion, or pneumothorax.    The cardiomediastinal silhouette is within normal limits.    No acute osseous abnormality.                                       X-Ray Wrist Complete Right (Final result)  Result time 07/10/23 12:47:19      Final result by Aren Hall MD (07/10/23 12:47:19)                   Narrative:    EXAMINATION  XR WRIST COMPLETE 3 VIEWS RIGHT    CLINICAL HISTORY  Pain, unspecified    TECHNIQUE  A total of 3 images submitted of the right wrist.    COMPARISON  None available at the time of initial interpretation.    FINDINGS  Acute comminuted fracture with intra-articular extension noted at the distal radial metaphysis.  There is significant lateral and posterior displacement greater than 1 shaft-width.  The displaced distal radial fragment remains well aligned with the carpal structures, although overall gross lateral subluxation of the wrist is evident relative to the distal radial and ulnar shafts.  Ulnar styloid fracture is also suspected, with the fragment component not clearly visualized.    Widespread soft tissue swelling and skin contour deformity noted.  No tracking subcutaneous gas or radiopaque foreign body.    IMPRESSION  1. Acute, markedly displaced distal radial fracture.  2. Cannot exclude ulnar styloid fracture.      Electronically signed by: Aren  Isabel  Date:    07/10/2023  Time:    12:47                                     CT Head Without Contrast (Final result)  Result time 07/10/23 07:42:42      Final result by Vaughn Shaw MD (07/10/23 07:42:42)                   Impression:    Impression:    1. No acute intracranial traumatic injury identified. Details and findings as noted above.      Electronically signed by: Vaughn Shaw  Date:    07/10/2023  Time:    07:42               Narrative:    EXAMINATION:  CT HEAD WITHOUT CONTRAST    CLINICAL HISTORY:  Head trauma, minor (Age >= 65y);    TECHNIQUE:  Low dose axial images were obtained through the head.  Coronal and sagittal reformations were also performed. Contrast was not administered.    Automatic exposure control was utilized to reduce the patient's radiation dose.    DLP= 1053    COMPARISON:  None.    FINDINGS:  Hemorrhage: No acute intracranial hemorrhage is seen.    CSF spaces: The ventricles, sulci and basal cisterns all appear moderately prominent global cerebral atrophy.    Brain parenchyma: Moderate microvascular change is seen in portions of the periventricular and deep white matter tracts.    Cerebellum: Unremarkable.    Vascular: Unremarkable venous sinuses. Mild atheromatous calcification of the intracranial arteries is seen.    Sella and skull base: The sella appears to be within normal limits for age.    Cerebellopontine angles: Within normal limits.    Herniation: None.    Intracranial calcifications: Incidental note is made of bilateral choroid plexus calcification. Incidental note is made of some pineal region calcification. Incidental note is made of some calcification of the falx.    Calvarium: No acute linear or depressed skull fracture is seen.    Maxillofacial Structures:    Paranasal sinuses: There is some mucoperiosteal thickening right maxillary sinuses and bilateral ethmoid air cells. This is consistent with sinusitis. The rest of the paranasal sinuses appear  clear.    Orbits: The orbits appear unremarkable.    Zygomatic arches: The zygomatic arches are intact and unremarkable.    Temporal bones and mastoids: The temporal bones and mastoids appear unremarkable.    TMJ: The mandibular condyles appear normally placed with respect to the mandibular fossa.    Nasal Bones: The nasal septum is midline.    Visualized upper cervical spine: The visualized cervical spine appears unremarkable.                        Preliminary result by Vaughn Shaw MD (07/09/23 23:38:41)                   Narrative:    START OF REPORT:  Technique: CT of the head was performed without intravenous contrast with axial as well as coronal and sagittal images.    Comparison: None.    Dosage Information: Automated Exposure Control was utilized 1052.8 mGy.cm.    Clinical history: Fall, head injury.    Findings:  Hemorrhage: No acute intracranial hemorrhage is seen.  CSF spaces: The ventricles, sulci and basal cisterns all appear moderately prominent global cerebral atrophy.  Brain parenchyma: Moderate microvascular change is seen in portions of the periventricular and deep white matter tracts.  Cerebellum: Unremarkable.  Vascular: Unremarkable venous sinuses. Mild atheromatous calcification of the intracranial arteries is seen.  Sella and skull base: The sella appears to be within normal limits for age.  Cerebellopontine angles: Within normal limits.  Herniation: None.  Intracranial calcifications: Incidental note is made of bilateral choroid plexus calcification. Incidental note is made of some pineal region calcification. Incidental note is made of some calcification of the falx.  Calvarium: No acute linear or depressed skull fracture is seen.    Maxillofacial Structures:  Paranasal sinuses: There is some mucoperiosteal thickening right maxillary sinuses and bilateral ethmoid air cells. This is consistent with sinusitis. The rest of the paranasal sinuses appear clear.  Orbits: The orbits appear  unremarkable.  Zygomatic arches: The zygomatic arches are intact and unremarkable.  Temporal bones and mastoids: The temporal bones and mastoids appear unremarkable.  TMJ: The mandibular condyles appear normally placed with respect to the mandibular fossa.  Nasal Bones: The nasal septum is midline.    Visualized upper cervical spine: The visualized cervical spine appears unremarkable.      Impression:  1. No acute intracranial traumatic injury identified. Details and findings as noted above.                                         Lab Review:   CBC:  Recent Labs   Lab Result Units 05/22/23  1131 05/23/23  0907 05/24/23  0510 07/10/23  0103 07/11/23  0408   WBC x10(3)/mcL 7.03  --  8.26 11.16 11.95*   RBC x10(6)/mcL 2.84*  --  3.70* 3.27* 3.21*   Hgb g/dL 7.6*   < > 10.2* 9.4* 8.9*   Hct % 26.0*   < > 33.4* 28.8* 28.7*   Platelet x10(3)/mcL 364  --  376 244 248   MCV fL 91.5  --  90.3 88.1 89.4   MCH pg 26.8*  --  27.6 28.7 27.7   MCHC g/dL 29.2*  --  30.5* 32.6* 31.0*    < > = values in this interval not displayed.       CMP:  Recent Labs   Lab Result Units 05/22/23  1130 07/10/23  0103   Calcium Level Total mg/dL 9.4 9.4   Albumin Level g/dL 3.6 3.9   Sodium Level mmol/L 139 137   Potassium Level mmol/L 4.4 4.2   Carbon Dioxide mmol/L 19* 24   Blood Urea Nitrogen mg/dL 26.3* 49.1*   Creatinine mg/dL 1.94* 2.47*   Alkaline Phosphatase unit/L 87 114   Alanine Aminotransferase unit/L 9 10   Aspartate Aminotransferase unit/L 10 13   Bilirubin Total mg/dL 0.5 0.5       Troponin:  Recent Labs   Lab Result Units 05/22/23  1130   Troponin-I ng/mL 0.013       ETOH:  No results for input(s): ETHANOL in the last 72 hours.     Urine Drug Screen:  No results for input(s): COCAINE, OPIATE, BARBITURATE, AMPHETAMINE, FENTANYL, CANNABINOIDS, MDMA in the last 72 hours.    Invalid input(s): BENZODIAZEPINE, PHENCYCLIDINE   Plan:   88yo F s/p GLF with right wrist dislocation     S/p ORIF R radius 7/10    - Underwent ORIF of R radius  yesterday with improvement in Wrist tenderness  - NWB RUE except for ADLs  - Keep splint to RUE until evaluated in Ortho clinic in 2 weeks  - medicine consulted to assist with muultiple medical comorbidities. Appreciate assistance   - Diabetic diet  - Eliquis ok to resume per Ortho recommendation  - Continue to monitor ALECIA for resolution, encourage diet  - lovenox ppx  - PT/OT. CM consulted to assist with dispo  - restart home meds when able

## 2023-07-11 NOTE — PROGRESS NOTES
[facility] - 8th Floor Med Surg  Orthopedics  Progress Note    Patient Name: Salma Mitchell  MRN: [id number]  Admission Date: 7/9/2023  Hospital Length of Stay: 1 days  Attending Provider: Rubi Stein MD  Primary Care Provider: Jv Mead II, MD  Follow-up For: Procedure(s) (LRB):  ORIF, FRACTURE, RADIUS (Right)    Post-Operative Day: 1 Day Post-Op  Subjective:         Principal Orthopedic Problem: right distal radius fracture    Interval History:  Postoperative day 1 open reduction internal fixation of right distal radius fracture.  Patient complains of pain to the right wrist as expected.  It is controlled with medications.  She has no new complaints.  No acute events since surgery.    Review of patient's allergies indicates:   Allergen Reactions    Adhesive     Center-al house dust     Dairy aid [lactase]     Grass pollen-june grass standard     Lexapro [escitalopram]     Pcn [penicillins]     Shellfish containing products        Current Facility-Administered Medications   Medication    0.9%  NaCl infusion    acetaminophen tablet 650 mg    acetaminophen tablet 650 mg    albuterol inhaler 2 puff    amLODIPine tablet 5 mg    atorvastatin tablet 10 mg    carvediloL tablet 3.125 mg    dextrose 10% bolus 125 mL 125 mL    dextrose 10% bolus 250 mL 250 mL    enoxaparin injection 30 mg    glucagon (human recombinant) injection 1 mg    insulin aspart U-100 injection 1-10 Units    LIDOcaine (PF) 10 mg/ml (1%) injection 10 mg    melatonin tablet 6 mg    morphine injection 2 mg    ondansetron disintegrating tablet 4 mg    oxyCODONE immediate release tablet 5 mg    oxyCODONE immediate release tablet Tab 10 mg    pantoprazole EC tablet 40 mg    polyethylene glycol packet 17 g    sertraline tablet 100 mg    sodium chloride 0.9% flush 10 mL    And    sodium chloride 0.9% flush 10 mL     Objective:     Vital Signs (Most Recent):  Temp: 98.8 °F (37.1 °C) (07/11/23 0724)  Pulse: 88 (07/11/23 0724)  Resp: 18  "(07/11/23 0724)  BP: (!) 151/68 (07/11/23 0724)  SpO2: (!) 91 % (07/11/23 0724) Vital Signs (24h Range):  Temp:  [98.4 °F (36.9 °C)-98.8 °F (37.1 °C)] 98.8 °F (37.1 °C)  Pulse:  [63-93] 88  Resp:  [14-24] 18  SpO2:  [90 %-98 %] 91 %  BP: ()/(51-81) 151/68     Weight: 97.1 kg (214 lb)  Height: 5' 1" (154.9 cm)  Body mass index is 40.43 kg/m².      Intake/Output Summary (Last 24 hours) at 7/11/2023 0858  Last data filed at 7/11/2023 0654  Gross per 24 hour   Intake 1050 ml   Output 700 ml   Net 350 ml       Ortho/SPM Exam  General: awake, alert, oriented.  Respirations nonlabored.  Skin pink, warm, dry.  No distress noted.      Right upper extremity: Splint is clean, dry, intact.  Compartments are compressible.  She can actively flex and extend her digits although range of motion is limited due to pain and swelling.  Brisk capillary refill distally.  Palpable radial pulse.    Significant Labs: CBC:   Recent Labs   Lab 07/10/23  0103 07/11/23  0408   WBC 11.16 11.95*   HGB 9.4* 8.9*   HCT 28.8* 28.7*    248     CMP:   Recent Labs   Lab 07/10/23  0103 07/11/23  0408    138   K 4.2 4.5   CO2 24 24   BUN 49.1* 56.9*   CREATININE 2.47* 3.31*   CALCIUM 9.4 9.1   ALBUMIN 3.9  --    BILITOT 0.5  --    ALKPHOS 114  --    AST 13  --    ALT 10  --      All pertinent labs within the past 24 hours have been reviewed.    Significant Imaging: X-Ray: I have reviewed all pertinent results/findings and my personal findings are:  Postop x-ray right wrist with good fracture alignment on hardware intact    Assessment/Plan:     Active Diagnoses:    Diagnosis Date Noted POA    Closed fracture of right distal radius [S52.501A] 07/10/2023 Unknown      Problems Resolved During this Admission:     Patient is status post open reduction internal fixation of right distal radius fracture.  H and H is down some as expected postop.  Patient is asymptomatic with stable vital signs.  Will defer to primary team on any transfusion " knee.  She will be nonweightbearing through the right wrist.  Okay to weightbear to the forearm on a platform if needed.  Okay for range-of-motion exercises to the digits.  Encouraged elevation for swelling.  Continue judicious pain control.  Ancef for 3 doses postop.  She is on Lovenox for DVT prophylaxis.  Keep splint clean, dry, intact for 2 weeks.  We will plan to remove her splint and sutures in the office in 2 weeks.  She is okay for placement from orthopedic standpoint when ready.  Please call with any questions or concerns.    The above findings, diagnosis, and treatment plan were discussed with Dr. Khoi Castaneda who is in agreement.      SUZETTE Judge  Orthopedics  [facility] - 8th Floor Med Surg

## 2023-07-11 NOTE — PLAN OF CARE
07/11/23 1213   Discharge Assessment   Assessment Type Discharge Planning Assessment   Confirmed/corrected address, phone number and insurance Yes   Confirmed Demographics Correct on Facesheet   Source of Information patient;family   When was your last doctors appointment?   (Pt states she sees Dr Mead every 6 months, she thinks last appointment was about 6 months ago)   Reason For Admission closed fracture of right distal radius   People in Home alone   Do you expect to return to your current living situation? Yes   Do you have help at home or someone to help you manage your care at home? Yes   Who are your caregiver(s) and their phone number(s)? Pt son lives close by and helps manage her medicines-[facility] [phone]   Current cognitive status: Alert/Oriented   Walking or Climbing Stairs ambulation difficulty, requires equipment   Mobility Management uses walker for ambulation   Equipment Currently Used at Home walker, rolling;cane, straight;bedside commode   Do you currently have service(s) that help you manage your care at home? Yes   Name and Contact number of agency [facility]   Is the pt/caregiver preference to resume services with current agency Yes   Do you take prescription medications? Yes   Do you have prescription coverage? Yes   Coverage Medicare   Do you have any problems affording any of your prescribed medications? No   Is the patient taking medications as prescribed? yes   Who is going to help you get home at discharge? Pt son Temo   How do you get to doctors appointments? family or friend will provide   Are you on dialysis? No   Do you take coumadin? No   Discharge Plan A Skilled Nursing Facility   Discharge Plan B Skilled Nursing Facility   Discharge Plan discussed with: Patient;Adult children  (spoke to son on phone [facility] [phone])   Transition of Care Barriers None     Pt is awake and alert. Pt just finished therapy. She answers questions appropriately but takes time to  think about answers. Spoke to patient son on phone Avinash to verify answers. Pt states she lives alone in an apartment on the first floor. She states she is able to bathe and feed herself. She states her son or other family member will bring her to grocery store. She states she uses the microwave for meals. She states she has DME. She states she is current with [facility]. Will follow for discharge needs.

## 2023-07-11 NOTE — PLAN OF CARE
Problem: Occupational Therapy  Goal: Occupational Therapy Goal  Description: Goals to be met by: 7/25/23     Patient will increase functional independence with ADLs by performing:    UE Dressing with Stand-by Assistance.  LE Dressing with Stand-by Assistance.  Grooming while standing at sink with Stand-by Assistance.  Toileting from bedside commode with Stand-by Assistance for hygiene and clothing management.   Toilet transfer to bedside commode with Stand-by Assistance.    Outcome: Ongoing, Progressing

## 2023-07-11 NOTE — PLAN OF CARE
Problem: Physical Therapy  Goal: Physical Therapy Goal  Description: Goals to be met by: 23     Patient will increase functional independence with mobility by performin. Supine to sit with Modified [place]  2. Sit to stand transfer with Stand-by Assistance  3. Gait  x 150 feet with Stand-by Assistance using Platform walker vs LRAD.     Outcome: Ongoing, Progressing

## 2023-07-11 NOTE — PLAN OF CARE
Problem: Skin Injury Risk Increased  Goal: Skin Health and Integrity  Outcome: Ongoing, Progressing     Problem: Adult Inpatient Plan of Care  Goal: Plan of Care Review  Outcome: Ongoing, Progressing  Goal: Patient-Specific Goal (Individualized)  Outcome: Ongoing, Progressing  Goal: Absence of Hospital-Acquired Illness or Injury  Outcome: Ongoing, Progressing  Goal: Optimal Comfort and Wellbeing  Outcome: Ongoing, Progressing  Goal: Readiness for Transition of Care  Outcome: Ongoing, Progressing     Problem: Bariatric Environmental Safety  Goal: Safety Maintained with Care  Outcome: Ongoing, Progressing     Problem: Fall Injury Risk  Goal: Absence of Fall and Fall-Related Injury  Outcome: Ongoing, Progressing     Problem: Infection  Goal: Absence of Infection Signs and Symptoms  Outcome: Ongoing, Progressing

## 2023-07-11 NOTE — PT/OT/SLP EVAL
Physical Therapy Evaluation    Patient Name:  Salma Mitchell   MRN:  [id number]    Recommendations:     Discharge Recommendations: nursing facility, skilled, rehabilitation facility   Discharge Equipment Recommendations: platform, walker, rolling   Barriers to discharge: Decreased caregiver support and Impaired mobility    Assessment:     Salma Mitchell is a 87 y.o. female admitted with a medical diagnosis of fall with R distal radius fx s/p ORIF. She presents with the following impairments/functional limitations: impaired endurance, impaired functional mobility, gait instability, impaired balance, impaired cognition, decreased safety awareness. Patient tolerated PT evaluation well, mobilizing with Regis for bed mobility and steps at bedside. Patient very fixated on pain in RUE and calling son, however unable to give number for pain scale. Patient's son on phone upon PT arrival, PT explained purpose of acute PT services and attained home environment and PLOF information from him. Patient appears confused, oriented to self and needed options for place and time, RN aware. Pt is appropriate for continued acute PT services with recommended d/c to SNF vs IP rehab.     Rehab Prognosis: Fair; patient would benefit from acute skilled PT services to address these deficits and reach maximum level of function.    Recent Surgery: Procedure(s) (LRB):  ORIF, FRACTURE, RADIUS (Right) 1 Day Post-Op    Plan:     During this hospitalization, patient to be seen 5 x/week to address the identified rehab impairments via gait training, therapeutic activities, therapeutic exercises, neuromuscular re-education and progress toward the following goals:    Plan of Care Expires:  08/11/23    Subjective     Chief Complaint: RUE discomfort  Patient/Family Comments/goals: none stated  Pain/Comfort:  Pain Rating 1: 0/10    Patients cultural, spiritual, Congregational conflicts given the current situation: no    Living Environment:  Pt lives alone in [facility],  unable to recall if she has stairs; pt's son lives nearby and checks on her daily.  Prior to admission, patients level of function was independent with mobility.  Equipment used at home: walker, rolling, cane, straight.  DME owned (not currently used): none.  Upon discharge, patient will have assistance from son.    Objective:     Communicated with RN prior to session.  Patient found HOB elevated with peripheral IV  upon PT entry to room.    General Precautions: Standard, fall  Orthopedic Precautions:RUE non weight bearing (OK for platform walker)   Braces: N/A  Respiratory Status: Room air    Exams:  Cognitive Exam:  Patient is oriented to Person  RLE ROM: WNL  RLE Strength: WNL  LLE ROM: WNL  LLE Strength: WNL  Skin integrity: Visible skin intact      Functional Mobility:  Bed Mobility:  Supine to Sit: minimum assistance  Transfers:  Sit to Stand:  minimum assistance with hand-held assist  Gait: patient ambulated x6 steps at EOB w/ Regis/HHA.       AM-PAC 6 CLICK MOBILITY  Total Score:18       Treatment & Education:    Patient provided with verbal education regarding PT POC, safety with mobility.  Understanding was verbalized.     Patient left up in chair with all lines intact, call button in reach, and RN notified.    GOALS:   Multidisciplinary Problems       Physical Therapy Goals          Problem: Physical Therapy    Goal Priority Disciplines Outcome Goal Variances Interventions   Physical Therapy Goal     PT, PT/OT Ongoing, Progressing     Description: Goals to be met by: 23     Patient will increase functional independence with mobility by performin. Supine to sit with Modified [place]  2. Sit to stand transfer with Stand-by Assistance  3. Gait  x 150 feet with Stand-by Assistance using Platform walker vs LRAD.                          History:     Past Medical History:   Diagnosis Date    Asthma     Diabetes mellitus     HLD (hyperlipidemia)     Hypertension     RONAL (obstructive sleep  apnea)     SOB (shortness of breath)     Unspecified cataract        Past Surgical History:   Procedure Laterality Date    CHOLECYSTECTOMY      HYSTERECTOMY      OPEN REDUCTION AND INTERNAL FIXATION (ORIF) OF FRACTURE OF RADIUS Right 7/10/2023    Procedure: ORIF, FRACTURE, RADIUS;  Surgeon: hKoi Castaneda MD;  Location: [facility];  Service: Orthopedics;  Laterality: Right;  DISTAL RADIUS, SUPINE, ARM TABLE, TOURNIQUET, BIOMET--YEMI JAVIER       Time Tracking:     PT Received On: 07/11/23  PT Start Time: 1014     PT Stop Time: 1030  PT Total Time (min): 16 min     Billable Minutes: Evaluation Mod complexity      07/11/2023

## 2023-07-12 LAB
ANION GAP SERPL CALC-SCNC: 13 MEQ/L
BASOPHILS # BLD AUTO: 0.03 X10(3)/MCL
BASOPHILS NFR BLD AUTO: 0.3 %
BUN SERPL-MCNC: 41.4 MG/DL (ref 9.8–20.1)
CALCIUM SERPL-MCNC: 9.4 MG/DL (ref 8.4–10.2)
CHLORIDE SERPL-SCNC: 103 MMOL/L (ref 98–107)
CO2 SERPL-SCNC: 25 MMOL/L (ref 23–31)
CREAT SERPL-MCNC: 2.1 MG/DL (ref 0.55–1.02)
CREAT/UREA NIT SERPL: 20
EOSINOPHIL # BLD AUTO: 0 X10(3)/MCL (ref 0–0.9)
EOSINOPHIL NFR BLD AUTO: 0 %
ERYTHROCYTE [DISTWIDTH] IN BLOOD BY AUTOMATED COUNT: 16.6 % (ref 11.5–17)
GFR SERPLBLD CREATININE-BSD FMLA CKD-EPI: 23 MLS/MIN/1.73/M2
GLUCOSE SERPL-MCNC: 221 MG/DL (ref 82–115)
HCT VFR BLD AUTO: 29.3 % (ref 37–47)
HGB BLD-MCNC: 9.2 G/DL (ref 12–16)
IMM GRANULOCYTES # BLD AUTO: 0.09 X10(3)/MCL (ref 0–0.04)
IMM GRANULOCYTES NFR BLD AUTO: 0.9 %
LYMPHOCYTES # BLD AUTO: 0.95 X10(3)/MCL (ref 0.6–4.6)
LYMPHOCYTES NFR BLD AUTO: 9.7 %
MCH RBC QN AUTO: 28 PG (ref 27–31)
MCHC RBC AUTO-ENTMCNC: 31.4 G/DL (ref 33–36)
MCV RBC AUTO: 89.1 FL (ref 80–94)
MONOCYTES # BLD AUTO: 0.74 X10(3)/MCL (ref 0.1–1.3)
MONOCYTES NFR BLD AUTO: 7.5 %
NEUTROPHILS # BLD AUTO: 8.01 X10(3)/MCL (ref 2.1–9.2)
NEUTROPHILS NFR BLD AUTO: 81.6 %
NRBC BLD AUTO-RTO: 0 %
PLATELET # BLD AUTO: 222 X10(3)/MCL (ref 130–400)
PMV BLD AUTO: 10.7 FL (ref 7.4–10.4)
POCT GLUCOSE: 177 MG/DL (ref 70–110)
POCT GLUCOSE: 198 MG/DL (ref 70–110)
POCT GLUCOSE: 242 MG/DL (ref 70–110)
POTASSIUM SERPL-SCNC: 4 MMOL/L (ref 3.5–5.1)
RBC # BLD AUTO: 3.29 X10(6)/MCL (ref 4.2–5.4)
SODIUM SERPL-SCNC: 141 MMOL/L (ref 136–145)
WBC # SPEC AUTO: 9.82 X10(3)/MCL (ref 4.5–11.5)

## 2023-07-12 PROCEDURE — 97535 SELF CARE MNGMENT TRAINING: CPT

## 2023-07-12 PROCEDURE — 97530 THERAPEUTIC ACTIVITIES: CPT

## 2023-07-12 PROCEDURE — 63600175 PHARM REV CODE 636 W HCPCS: Performed by: STUDENT IN AN ORGANIZED HEALTH CARE EDUCATION/TRAINING PROGRAM

## 2023-07-12 PROCEDURE — 63600175 PHARM REV CODE 636 W HCPCS: Performed by: INTERNAL MEDICINE

## 2023-07-12 PROCEDURE — 85025 COMPLETE CBC W/AUTO DIFF WBC: CPT | Performed by: STUDENT IN AN ORGANIZED HEALTH CARE EDUCATION/TRAINING PROGRAM

## 2023-07-12 PROCEDURE — 25000003 PHARM REV CODE 250: Performed by: STUDENT IN AN ORGANIZED HEALTH CARE EDUCATION/TRAINING PROGRAM

## 2023-07-12 PROCEDURE — 21400001 HC TELEMETRY ROOM

## 2023-07-12 PROCEDURE — 94761 N-INVAS EAR/PLS OXIMETRY MLT: CPT

## 2023-07-12 PROCEDURE — A4216 STERILE WATER/SALINE, 10 ML: HCPCS | Performed by: STUDENT IN AN ORGANIZED HEALTH CARE EDUCATION/TRAINING PROGRAM

## 2023-07-12 PROCEDURE — 80048 BASIC METABOLIC PNL TOTAL CA: CPT | Performed by: STUDENT IN AN ORGANIZED HEALTH CARE EDUCATION/TRAINING PROGRAM

## 2023-07-12 RX ORDER — HALOPERIDOL 5 MG/ML
5 INJECTION INTRAMUSCULAR EVERY 6 HOURS PRN
Status: DISCONTINUED | OUTPATIENT
Start: 2023-07-12 | End: 2023-07-13 | Stop reason: HOSPADM

## 2023-07-12 RX ADMIN — CARVEDILOL 3.12 MG: 3.12 TABLET, FILM COATED ORAL at 09:07

## 2023-07-12 RX ADMIN — PANTOPRAZOLE SODIUM 40 MG: 40 TABLET, DELAYED RELEASE ORAL at 09:07

## 2023-07-12 RX ADMIN — AMLODIPINE BESYLATE 5 MG: 5 TABLET ORAL at 09:07

## 2023-07-12 RX ADMIN — SERTRALINE HYDROCHLORIDE 100 MG: 50 TABLET ORAL at 09:07

## 2023-07-12 RX ADMIN — Medication 6 MG: at 12:07

## 2023-07-12 RX ADMIN — ATORVASTATIN CALCIUM 10 MG: 10 TABLET, FILM COATED ORAL at 09:07

## 2023-07-12 RX ADMIN — CARVEDILOL 3.12 MG: 3.12 TABLET, FILM COATED ORAL at 08:07

## 2023-07-12 RX ADMIN — POLYETHYLENE GLYCOL 3350 17 G: 17 POWDER, FOR SOLUTION ORAL at 09:07

## 2023-07-12 RX ADMIN — ACETAMINOPHEN 650 MG: 325 TABLET, FILM COATED ORAL at 09:07

## 2023-07-12 RX ADMIN — Medication 10 ML: at 12:07

## 2023-07-12 RX ADMIN — OXYCODONE HYDROCHLORIDE 10 MG: 10 TABLET ORAL at 12:07

## 2023-07-12 RX ADMIN — INSULIN ASPART 4 UNITS: 100 INJECTION, SOLUTION INTRAVENOUS; SUBCUTANEOUS at 11:07

## 2023-07-12 RX ADMIN — HALOPERIDOL LACTATE 5 MG: 5 INJECTION, SOLUTION INTRAMUSCULAR at 05:07

## 2023-07-12 NOTE — PLAN OF CARE
Spoke to Raven with [facility]. They will be able to accept patient tomorrow if medically stable. Dr Stein notified.

## 2023-07-12 NOTE — NURSING
PT was reported to pull IV out     PT continues to refuse attempts to place dhillon     Will continue to attempt placement     MD is aware as well

## 2023-07-12 NOTE — PT/OT/SLP PROGRESS
Occupational Therapy   Treatment    Name: Salma Mitchell  MRN: [id number]  Admitting Diagnosis:  Fall: R CMC dislocation, closed fx of Distal radius, s/p ORIF of R intra-articular radius fx  2 Days Post-Op    Recommendations:     Discharge Recommendations: rehabilitation facility, nursing facility, skilled  Discharge Equipment Recommendations:  bedside commode  Barriers to discharge:  Decreased caregiver support    Assessment:     Salma Mitchell is a 87 y.o. female with a medical diagnosis of Fall: R CMC dislocation, closed fx of Distal radius, s/p ORIF of R intra-articular radius fx. Performance deficits affecting function are weakness, impaired cognition, orthopedic precautions, impaired self care skills. Pt's friend, son, and 1:1 present at bedside today. Pt making good progress towards goals. Pt reported having some hallucinations at night time, saying that someone was beating her and that she has tattoos all over her body. Pt making good progress towards OT goals. Pt would benefit from rehab vs SNF placement upon d/c.     Rehab Prognosis:  Good; patient would benefit from acute skilled OT services to address these deficits and reach maximum level of function.       Plan:     Patient to be seen 5 x/week to address the above listed problems via self-care/home management, therapeutic activities, therapeutic exercises  Plan of Care Expires: 07/25/23  Plan of Care Reviewed with: patient    Subjective     Pain/Comfort:  Pain Rating 1: 9/10  Location - Side 1: Right  Location 1: arm    Objective:     Communicated with: RN prior to session.  Patient found  sitting up on couch  with peripheral IV upon OT entry to room.    General Precautions: Standard, fall    Orthopedic Precautions:RUE non weight bearing (NWB except for ADLs. No ROM of wrist), OK to use PRW   Braces: N/A  Respiratory Status: Room air     Occupational Performance:     Functional Mobility/Transfers:  Patient completed Sit <> Stand Transfer with minimum  assistance  with  platform walker   Patient completed Toilet Transfer Step Transfer technique with minimum assistance with  platform walker  Functional Mobility: pt navigated through room and bathroom safely using platform walker and Min A. No LOB noted    Activities of Daily Living:  Grooming: contact guard assistance CGA to stand at sink to comb hair  Toileting: minimum assistance and moderate assistance min/mod A for clothing management at toilet      Therapeutic Positioning    OT interventions performed during the course of today's session in an effort to prevent and/or reduce acquired pressure injuries:   Therapeutic positioning completed     Skin assessment: all bony prominences were assessed    Findings: no redness or breakdown noted      Patient Education:  Patient provided with verbal education regarding OT role/goals/POC, post op precautions, fall prevention, safety awareness, and Discharge/DME recommendations.  Understanding was verbalized, however additional teaching warranted.      Patient left  sitting on couch (1 to 1 supervision)  with all lines intact and call button in reach    GOALS:   Multidisciplinary Problems       Occupational Therapy Goals          Problem: Occupational Therapy    Goal Priority Disciplines Outcome Interventions   Occupational Therapy Goal     OT, PT/OT Ongoing, Progressing    Description: Goals to be met by: 7/25/23     Patient will increase functional independence with ADLs by performing:    UE Dressing with Stand-by Assistance.  LE Dressing with Stand-by Assistance.  Grooming while standing at sink with Stand-by Assistance.  Toileting from bedside commode with Stand-by Assistance for hygiene and clothing management.   Toilet transfer to bedside commode with Stand-by Assistance.                         Time Tracking:     OT Date of Treatment: 07/12/23  OT Start Time: 0930  OT Stop Time: 0953  OT Total Time (min): 23 min    Billable Minutes:Self Care/Home Management 2  units    OT/FAISAL: OT     Number of FAISAL visits since last OT visit: 1    7/12/2023

## 2023-07-12 NOTE — PROGRESS NOTES
[facility] - 8th Floor Med Surg  Orthopedics  Progress Note    Patient Name: Salma Mitchell  MRN: [id number]  Admission Date: 7/9/2023  Hospital Length of Stay: 2 days  Attending Provider: Rubi Stein MD  Primary Care Provider: Jv Mead II, MD  Follow-up For: Procedure(s) (LRB):  ORIF, FRACTURE, RADIUS (Right)    Post-Operative Day: 2 Day Post-Op  Subjective:         Principal Orthopedic Problem: right distal radius fracture    Interval History:  Postoperative day 2 open reduction internal fixation of right distal radius fracture.  Patient complains of pain to the right wrist as expected.  It is controlled with medications.  She has no new complaints.  Pt confused with 1 on 1 sitter today.     Review of patient's allergies indicates:   Allergen Reactions    Adhesive     Center-al house dust     Dairy aid [lactase]     Grass pollen-june grass standard     Lexapro [escitalopram]     Pcn [penicillins]     Shellfish containing products        Current Facility-Administered Medications   Medication    0.9%  NaCl infusion    acetaminophen tablet 650 mg    acetaminophen tablet 650 mg    albuterol inhaler 2 puff    amLODIPine tablet 5 mg    atorvastatin tablet 10 mg    carvediloL tablet 3.125 mg    dextrose 10% bolus 125 mL 125 mL    dextrose 10% bolus 250 mL 250 mL    enoxaparin injection 30 mg    glucagon (human recombinant) injection 1 mg    haloperidol lactate injection 5 mg    insulin aspart U-100 injection 1-10 Units    LIDOcaine (PF) 10 mg/ml (1%) injection 10 mg    melatonin tablet 6 mg    morphine injection 2 mg    ondansetron disintegrating tablet 4 mg    oxyCODONE immediate release tablet 5 mg    oxyCODONE immediate release tablet Tab 10 mg    pantoprazole EC tablet 40 mg    polyethylene glycol packet 17 g    sertraline tablet 100 mg    sodium chloride 0.9% flush 10 mL    And    sodium chloride 0.9% flush 10 mL     Objective:     Vital Signs (Most Recent):  Temp: 98.2 °F (36.8 °C)  Pt will come in today for visit per Dr Bloom   "(07/12/23 0718)  Pulse: 84 (07/12/23 0718)  Resp: 16 (07/12/23 0021)  BP: (!) 155/76 (07/12/23 0718)  SpO2: (!) 93 % (07/12/23 0718) Vital Signs (24h Range):  Temp:  [96.8 °F (36 °C)-99.8 °F (37.7 °C)] 98.2 °F (36.8 °C)  Pulse:  [81-92] 84  Resp:  [16-20] 16  SpO2:  [91 %-96 %] 93 %  BP: (141-176)/(53-76) 155/76     Weight: 97.1 kg (214 lb)  Height: 5' 1" (154.9 cm)  Body mass index is 40.43 kg/m².      Intake/Output Summary (Last 24 hours) at 7/12/2023 0739  Last data filed at 7/11/2023 1800  Gross per 24 hour   Intake 360 ml   Output 900 ml   Net -540 ml         Ortho/SPM Exam  General: awake, alert, sitting on cough. Respirations nonlabored.  Skin pink, warm, dry.  No distress noted.      Right upper extremity: Splint is clean, dry, intact.  Compartments are compressible.  She can actively flex and extend her digits although range of motion is limited due to pain and swelling.  Brisk capillary refill distally.  Palpable radial pulse.    Significant Labs: CBC:   Recent Labs   Lab 07/11/23  0408   WBC 11.95*   HGB 8.9*   HCT 28.7*          CMP:   Recent Labs   Lab 07/11/23  0408 07/11/23  1804    138   K 4.5 4.4   CO2 24 22*   BUN 56.9* 47.8*   CREATININE 3.31* 2.71*   CALCIUM 9.1 9.2       All pertinent labs within the past 24 hours have been reviewed.    Significant Imaging: X-Ray: I have reviewed all pertinent results/findings and my personal findings are:  Postop x-ray right wrist with good fracture alignment on hardware intact    Assessment/Plan:     Active Diagnoses:    Diagnosis Date Noted POA    Closed fracture of right distal radius [S52.501A] 07/10/2023 Unknown      Problems Resolved During this Admission:     Patient is 2 days status post open reduction internal fixation of right distal radius fracture.  H and H is pending today. Will defer to primary team on any transfusion knee.  She will be nonweightbearing through the right wrist.  Okay to weightbear to the forearm on a platform if " needed.  Okay for range-of-motion exercises to the digits.  Encouraged elevation for swelling.  Continue judicious pain control.  She is on Lovenox for DVT prophylaxis.  Keep splint clean, dry, intact for 2 weeks.  We will plan to remove her splint and sutures in the office in 2 weeks.  She is okay for placement from orthopedic standpoint when ready.  Please call with any questions or concerns.    The above findings, diagnosis, and treatment plan were discussed with Dr. Khoi Castaneda who is in agreement.      SUZETTE Judge  Orthopedics  [facility] - 8th Floor Med Surg

## 2023-07-12 NOTE — NURSING
Bladder Scan noted 150cc of urine   Pt is noted to be urinating well with no concerns reported     Pt is requesting to be placed at rehab and Long Term Placement    [facility] in [place] [phone] for rehab and placement      notified as well

## 2023-07-12 NOTE — NURSING
Pt refused s/s insulin   Family noted to be in room at time of refusal     Pt refused noon and evening insulin due to low intake will continue to encourage pt to intake meals as tolerated as well as fluids.     No s/s of hypoglycemia noted

## 2023-07-12 NOTE — NURSING
Nurses Note -- 4 Eyes    7/12/2023   3:39 PM      Skin assessed during: Admit      [] No Altered Skin Integrity Present    []Prevention Measures Documented      [] Yes- Altered Skin Integrity Present or Discovered   [] LDA Added if Not in Epic (Describe Wound)   [] New Altered Skin Integrity was Present on Admit and Documented in LDA   [] Wound Image Taken    Wound Care Consulted? No    Attending Nurse:  Eva Milian LPN     Second RN/Staff Member: Clarita Theodore RN

## 2023-07-12 NOTE — PLAN OF CARE
Discharge planning discussed with patient. FOC obtained for [facility]. Referral sent via care port.

## 2023-07-12 NOTE — PROGRESS NOTES
[facility]  Hospital Medicine Progress Note        Chief Complaint: Inpatient Follow-up for     HPI: 87 y.o. female with PMHx of COPD, HTN, HLD, RONAL, DM 2 who initially presented to [facility] on 7/9/2023 via EMS following a fall.  Pt reportedly fell onto her right side, she really hit her head on the counter during the fall.  She had complaints of right wrist pain.  She was given fentanyl 95 mcg via EMS.      It was reported the patient was on Eliquis, however, patient is unsure whether she is on Eliquis, and states she may have been on it before at some point. Eliquis is not listed on her home medications per review of EMR.      Initial ED VS include /50, HR 62, RR 18, SpO2 97%, temperature 97.7° F.  Labs notable for hemoglobin 9.4, hematocrit 28.8, BUN 49.1, creatinine 2.47, glucose 220.  CXR negative.  CT head without contrast negative for acute intracranial abnormality.  She was noted to have a right wrist dislocation, attempts to reduce under sedation ED or unsuccessful.  She was admitted to trauma services.  Orthopedic surgery was consulted for operative reduction. Hospital Medicine team was consulted for medical management.  Patient apparently was consider nursing home placement prior to the injury.    Interval Hx:     Patient seen and examined this morning has one-to-one sitter bedside knows her name, knows her doctor's name know she is in the hospital    Objective/physical exam:  General: In no acute distress, afebrile  Chest: Clear to auscultation bilaterally  Heart: RRR, +S1, S2, no appreciable murmur  Abdomen: Soft, nontender, BS +  MSK: Warm, no lower extremity edema, no clubbing or cyanosis  Neurologic: Alert and oriented x4,   VITAL SIGNS: 24 HRS MIN & MAX LAST   Temp  Min: 96.8 °F (36 °C)  Max: 99.8 °F (37.7 °C) 98.2 °F (36.8 °C)   BP  Min: 147/62  Max: 176/70 (!) 155/76   Pulse  Min: 81  Max: 92  84   Resp  Min: 16  Max: 18 16   SpO2  Min: 93 %  Max: 96 % (!) 93 %      I have reviewed the following labs:    Recent Labs   Lab 07/10/23  0103 07/11/23  0408 07/12/23  0912   WBC 11.16 11.95* 9.82   RBC 3.27* 3.21* 3.29*   HGB 9.4* 8.9* 9.2*   HCT 28.8* 28.7* 29.3*   MCV 88.1 89.4 89.1   MCH 28.7 27.7 28.0   MCHC 32.6* 31.0* 31.4*   RDW 16.5 16.9 16.6    248 222   MPV 10.7* 10.8* 10.7*       Recent Labs   Lab 07/10/23  0103 07/11/23  0408 07/11/23  1804 07/12/23  0912    138 138 141   K 4.2 4.5 4.4 4.0   CO2 24 24 22* 25   BUN 49.1* 56.9* 47.8* 41.4*   CREATININE 2.47* 3.31* 2.71* 2.10*   CALCIUM 9.4 9.1 9.2 9.4   ALBUMIN 3.9  --   --   --    ALKPHOS 114  --   --   --    ALT 10  --   --   --    AST 13  --   --   --    BILITOT 0.5  --   --   --           Microbiology Results (last 7 days)       ** No results found for the last 168 hours. **             See below for Radiology    Scheduled Med:   amLODIPine  5 mg Oral Daily    atorvastatin  10 mg Oral Daily    carvediloL  3.125 mg Oral BID    enoxparin  30 mg Subcutaneous Q24H    pantoprazole  40 mg Oral Daily    polyethylene glycol  17 g Oral Daily    sertraline  100 mg Oral Daily    sodium chloride 0.9%  10 mL Intravenous Q6H        Continuous Infusions:   sodium chloride 0.9% 75 mL/hr at 07/11/23 0791        PRN Meds:  acetaminophen, acetaminophen, albuterol, dextrose 10%, dextrose 10%, glucagon (human recombinant), haloperidol lactate, insulin aspart U-100, LIDOcaine (PF) 10 mg/ml (1%), melatonin, morphine, ondansetron, oxyCODONE, oxyCODONE, Flushing PICC Protocol **AND** sodium chloride 0.9% **AND** sodium chloride 0.9%       Assessment/Plan:  Acute kidney injury on CKD 3  Fall   Right distal radius fracture status post open reduction and internal fixation Essential hypertension  Diabetes mellitus type 2 with hyperglycemia  RONAL on CPAP   Hyperlipidemia    Creatinine is improving losartan metolazone Lasix is on hold   Continue with IV fluids, creatinine of 2.1 today  Retroperitoneal ultrasound showed medical renal  disease as such no obstructive uropathy  Patient is status post open reduction and internal fixation Orthopedics following   Will consult PT and OT   Repeat blood work in a.m.  Patient has been accepted but we want to make sure creatinine continues to improve   Repeat blood work in a.m.  VTE prophylaxis: lovenox    Patient condition:  Stable/Fair/Guarded/ Serious/ Critical    Anticipated discharge and Disposition:         All diagnosis and differential diagnosis have been reviewed; assessment and plan has been documented; I have personally reviewed the labs and test results that are presently available; I have reviewed the patients medication list; I have reviewed the consulting providers response and recommendations. I have reviewed or attempted to review medical records based upon their availability    All of the patient's questions have been  addressed and answered. Patient's is agreeable to the above stated plan. I will continue to monitor closely and make adjustments to medical management as needed.  _____________________________________________________________________    Nutrition Status:    Radiology:  I have personally reviewed the following imaging and agree with the radiologist.     US Retroperitoneal Limited  Narrative: EXAMINATION:  US RETROPERITONEAL LIMITED    CLINICAL HISTORY:  ALECIA;    TECHNIQUE:  Multiple sonographic images the kidneys were obtained by department sonographer.    COMPARISON:  None    FINDINGS:  The left kidney is not identified secondary to overlying bowel gas.  The right kidney measures 7.4 cm in length.  No evidence for hydronephrosis or calculus.  The parenchyma demonstrates increased echogenicity with cortical thinning.  Impression: Nonvisualized left kidney.  Likely medical renal disease of the right kidney without evidence for obstructive uropathy.    Electronically signed by: Faizan Herrera MD  Date:    07/10/2023  Time:    21:36  X-Ray Wrist Complete Right  Narrative:  EXAMINATION:  Right wrist three views    CLINICAL HISTORY:  Postop evaluation    COMPARISON:  07/10/2023    FINDINGS:  There has been interval open reduction internal fixation noted.  There are 2 screws fixating the distal radius intra-articular fracture now demonstrating near anatomic alignment.  There is dorsal plate and screws extending from the mid radius to 2nd metacarpal.  The hardware appears intact.  The foot is placed ulnar styloid fracture is unchanged.  Impression: Interval postop changes without hardware complication or failure.  Comminuted distal radius intra-articular fracture is in near anatomic alignment.    Electronically signed by: Brain Gracia MD  Date:    07/10/2023  Time:    14:14  SURG FL Surgery Fluoro Usage  See OP Notes for results.     IMPRESSION: See OP Notes for results.     This procedure was auto-finalized by: Virtual Radiologist  X-Ray Wrist 2 View Right  EXAMINATION  XR WRIST 2 VIEW RIGHT    CLINICAL HISTORY  post reduction ; Pain, unspecified    TECHNIQUE  A total of 2 images submitted of the right wrist.    COMPARISON  9 July 2023    FINDINGS  Interval splinting of the forearm/wrist noted; associated artifact obscures fine osseous detail.  There is no significant interval change in the grossly displaced intra-articular distal radial fracture.  No definite newly displaced cortical injury is identified.  Visualized joints are of similar alignment.  Ulnar styloid fracture is again suspected, without clear visualization of the displaced fragment.    Regional soft tissues are unchanged.    IMPRESSION  Interval splinting with no significant change in fracture alignment.    Electronically signed by: Aren Hall  Date:    07/10/2023  Time:    12:50  X-Ray Wrist Complete Right  EXAMINATION  XR WRIST COMPLETE 3 VIEWS RIGHT    CLINICAL HISTORY  Pain, unspecified    TECHNIQUE  A total of 3 images submitted of the right wrist.    COMPARISON  None available at the time of initial  interpretation.    FINDINGS  Acute comminuted fracture with intra-articular extension noted at the distal radial metaphysis.  There is significant lateral and posterior displacement greater than 1 shaft-width.  The displaced distal radial fragment remains well aligned with the carpal structures, although overall gross lateral subluxation of the wrist is evident relative to the distal radial and ulnar shafts.  Ulnar styloid fracture is also suspected, with the fragment component not clearly visualized.    Widespread soft tissue swelling and skin contour deformity noted.  No tracking subcutaneous gas or radiopaque foreign body.    IMPRESSION  1. Acute, markedly displaced distal radial fracture.  2. Cannot exclude ulnar styloid fracture.    Electronically signed by: Aren Hall  Date:    07/10/2023  Time:    12:47  CT Head Without Contrast  Narrative: EXAMINATION:  CT HEAD WITHOUT CONTRAST    CLINICAL HISTORY:  Head trauma, minor (Age >= 65y);    TECHNIQUE:  Low dose axial images were obtained through the head.  Coronal and sagittal reformations were also performed. Contrast was not administered.    Automatic exposure control was utilized to reduce the patient's radiation dose.    DLP= 1053    COMPARISON:  None.    FINDINGS:  Hemorrhage: No acute intracranial hemorrhage is seen.    CSF spaces: The ventricles, sulci and basal cisterns all appear moderately prominent global cerebral atrophy.    Brain parenchyma: Moderate microvascular change is seen in portions of the periventricular and deep white matter tracts.    Cerebellum: Unremarkable.    Vascular: Unremarkable venous sinuses. Mild atheromatous calcification of the intracranial arteries is seen.    Sella and skull base: The sella appears to be within normal limits for age.    Cerebellopontine angles: Within normal limits.    Herniation: None.    Intracranial calcifications: Incidental note is made of bilateral choroid plexus calcification. Incidental note is made  of some pineal region calcification. Incidental note is made of some calcification of the falx.    Calvarium: No acute linear or depressed skull fracture is seen.    Maxillofacial Structures:    Paranasal sinuses: There is some mucoperiosteal thickening right maxillary sinuses and bilateral ethmoid air cells. This is consistent with sinusitis. The rest of the paranasal sinuses appear clear.    Orbits: The orbits appear unremarkable.    Zygomatic arches: The zygomatic arches are intact and unremarkable.    Temporal bones and mastoids: The temporal bones and mastoids appear unremarkable.    TMJ: The mandibular condyles appear normally placed with respect to the mandibular fossa.    Nasal Bones: The nasal septum is midline.    Visualized upper cervical spine: The visualized cervical spine appears unremarkable.  Impression: Impression:    1. No acute intracranial traumatic injury identified. Details and findings as noted above.    Electronically signed by: Vaughn Shaw  Date:    07/10/2023  Time:    07:42  X-Ray Chest AP Portable  Narrative: EXAMINATION:  XR CHEST AP PORTABLE    CLINICAL HISTORY:  pre op;    TECHNIQUE:  Single view of the chest    COMPARISON:  05/22/2023    FINDINGS:  No focal opacification, pleural effusion, or pneumothorax.    The cardiomediastinal silhouette is within normal limits.    No acute osseous abnormality.  Impression: No acute cardiopulmonary process.    Electronically signed by: Vaughn Shaw  Date:    07/10/2023  Time:    07:01      Rubi Stein MD   07/12/2023

## 2023-07-12 NOTE — NURSING
Family, Son called this nurse to inquire about PT glasses     Ambulance told family that they handed her glasses to the nurse in ER     Security nor ER reported to have gold glasses, however, will call floor should they find them.    Pt and family is aware.

## 2023-07-12 NOTE — PT/OT/SLP PROGRESS
Physical Therapy Treatment    Patient Name:  Salma Mitchell   MRN:  [id number]    Recommendations:     Discharge Recommendations: nursing facility, skilled, rehabilitation facility  Discharge Equipment Recommendations: platform, walker, rolling  Barriers to discharge: Impaired mobility    Assessment:     Salma Mitchell is a 87 y.o. female admitted with a medical diagnosis of fall with distal radius fx s/p ORIF. She presents with the following impairments/functional limitations: impaired endurance, impaired functional mobility, gait instability, impaired balance, orthopedic precautions. Patient progressing well towards functional PT goals, ambulating with platform walker and Regis. Patient is appropriate for continued acute PT goals with recommended d/c to SNF vs rehab.     Rehab Prognosis: Good; patient would benefit from acute skilled PT services to address these deficits and reach maximum level of function.    Recent Surgery: Procedure(s) (LRB):  ORIF, FRACTURE, RADIUS (Right) 2 Days Post-Op    Plan:     During this hospitalization, patient to be seen 5 x/week to address the identified rehab impairments via gait training, therapeutic activities, therapeutic exercises, neuromuscular re-education and progress toward the following goals:    Plan of Care Expires:  08/11/23    Subjective     Chief Complaint: none stated  Patient/Family Comments/goals: to get stronger  Pain/Comfort:  Pain Rating 1:  (c/o pain, unable to give number)      Objective:     Communicated with RN prior to session.  Patient found  sitting on couch  with peripheral IV upon PT entry to room.     General Precautions: Standard, fall  Orthopedic Precautions: RUE non weight bearing (OK for platform walker)  Braces: N/A  Respiratory Status: Room air  Skin Integrity: Visible skin intact    Therapeutic Activities/Exercises:  Patient seated on couch upon arrival. Pt required Regis for sit<>stand with platform walker. Patient ambulated 90ft with Regis and  platform walker, decreased gait speed, kyphotic posture, assist required to control walker. Pt returned to sitting up on couch with 1:1 sitter present and family in room.     Education:  Patient provided with verbal education regarding PT POC, safety with mobility.  Understanding was verbalized, however additional teaching warranted.     Patient left  sitting on couch  with all lines intact, call button in reach, and family and staff present.    GOALS:   Multidisciplinary Problems       Physical Therapy Goals          Problem: Physical Therapy    Goal Priority Disciplines Outcome Goal Variances Interventions   Physical Therapy Goal     PT, PT/OT Ongoing, Progressing     Description: Goals to be met by: 23     Patient will increase functional independence with mobility by performin. Supine to sit with Modified [place]  2. Sit to stand transfer with Stand-by Assistance  3. Gait  x 150 feet with Stand-by Assistance using Platform walker vs LRAD.                          Time Tracking:     PT Received On: 23  PT Start Time: 944     PT Stop Time: 954  PT Total Time (min): 10 min     Billable Minutes: Therapeutic Activity 10min    Treatment Type: Treatment  PT/PTA: PT     Number of PTA visits since last PT visit: 2023

## 2023-07-13 VITALS
RESPIRATION RATE: 18 BRPM | OXYGEN SATURATION: 98 % | HEIGHT: 61 IN | SYSTOLIC BLOOD PRESSURE: 162 MMHG | BODY MASS INDEX: 40.4 KG/M2 | HEART RATE: 77 BPM | DIASTOLIC BLOOD PRESSURE: 55 MMHG | WEIGHT: 214 LBS | TEMPERATURE: 98 F

## 2023-07-13 LAB
ANION GAP SERPL CALC-SCNC: 11 MEQ/L
BASOPHILS # BLD AUTO: 0.05 X10(3)/MCL
BASOPHILS NFR BLD AUTO: 0.6 %
BUN SERPL-MCNC: 38.8 MG/DL (ref 9.8–20.1)
CALCIUM SERPL-MCNC: 9.6 MG/DL (ref 8.4–10.2)
CHLORIDE SERPL-SCNC: 104 MMOL/L (ref 98–107)
CO2 SERPL-SCNC: 26 MMOL/L (ref 23–31)
CREAT SERPL-MCNC: 1.85 MG/DL (ref 0.55–1.02)
CREAT/UREA NIT SERPL: 21
EOSINOPHIL # BLD AUTO: 0 X10(3)/MCL (ref 0–0.9)
EOSINOPHIL NFR BLD AUTO: 0 %
ERYTHROCYTE [DISTWIDTH] IN BLOOD BY AUTOMATED COUNT: 16.6 % (ref 11.5–17)
GFR SERPLBLD CREATININE-BSD FMLA CKD-EPI: 26 MLS/MIN/1.73/M2
GLUCOSE SERPL-MCNC: 199 MG/DL (ref 82–115)
HCT VFR BLD AUTO: 29.1 % (ref 37–47)
HGB BLD-MCNC: 9 G/DL (ref 12–16)
IMM GRANULOCYTES # BLD AUTO: 0.07 X10(3)/MCL (ref 0–0.04)
IMM GRANULOCYTES NFR BLD AUTO: 0.8 %
LYMPHOCYTES # BLD AUTO: 1.13 X10(3)/MCL (ref 0.6–4.6)
LYMPHOCYTES NFR BLD AUTO: 13 %
MCH RBC QN AUTO: 27.4 PG (ref 27–31)
MCHC RBC AUTO-ENTMCNC: 30.9 G/DL (ref 33–36)
MCV RBC AUTO: 88.7 FL (ref 80–94)
MONOCYTES # BLD AUTO: 0.81 X10(3)/MCL (ref 0.1–1.3)
MONOCYTES NFR BLD AUTO: 9.3 %
NEUTROPHILS # BLD AUTO: 6.63 X10(3)/MCL (ref 2.1–9.2)
NEUTROPHILS NFR BLD AUTO: 76.3 %
NRBC BLD AUTO-RTO: 0 %
PLATELET # BLD AUTO: 235 X10(3)/MCL (ref 130–400)
PMV BLD AUTO: 10.5 FL (ref 7.4–10.4)
POCT GLUCOSE: 191 MG/DL (ref 70–110)
POCT GLUCOSE: 244 MG/DL (ref 70–110)
POTASSIUM SERPL-SCNC: 3.9 MMOL/L (ref 3.5–5.1)
RBC # BLD AUTO: 3.28 X10(6)/MCL (ref 4.2–5.4)
SODIUM SERPL-SCNC: 141 MMOL/L (ref 136–145)
WBC # SPEC AUTO: 8.69 X10(3)/MCL (ref 4.5–11.5)

## 2023-07-13 PROCEDURE — 63600175 PHARM REV CODE 636 W HCPCS: Performed by: STUDENT IN AN ORGANIZED HEALTH CARE EDUCATION/TRAINING PROGRAM

## 2023-07-13 PROCEDURE — 97535 SELF CARE MNGMENT TRAINING: CPT

## 2023-07-13 PROCEDURE — 97530 THERAPEUTIC ACTIVITIES: CPT

## 2023-07-13 PROCEDURE — 85025 COMPLETE CBC W/AUTO DIFF WBC: CPT | Performed by: STUDENT IN AN ORGANIZED HEALTH CARE EDUCATION/TRAINING PROGRAM

## 2023-07-13 PROCEDURE — 80048 BASIC METABOLIC PNL TOTAL CA: CPT | Performed by: STUDENT IN AN ORGANIZED HEALTH CARE EDUCATION/TRAINING PROGRAM

## 2023-07-13 PROCEDURE — 25000003 PHARM REV CODE 250: Performed by: INTERNAL MEDICINE

## 2023-07-13 PROCEDURE — 25000003 PHARM REV CODE 250: Performed by: STUDENT IN AN ORGANIZED HEALTH CARE EDUCATION/TRAINING PROGRAM

## 2023-07-13 PROCEDURE — 97110 THERAPEUTIC EXERCISES: CPT

## 2023-07-13 RX ORDER — QUETIAPINE FUMARATE 25 MG/1
25 TABLET, FILM COATED ORAL NIGHTLY
Status: DISCONTINUED | OUTPATIENT
Start: 2023-07-13 | End: 2023-07-13 | Stop reason: HOSPADM

## 2023-07-13 RX ORDER — QUETIAPINE FUMARATE 25 MG/1
25 TABLET, FILM COATED ORAL NIGHTLY
Qty: 30 TABLET | Refills: 0 | Status: SHIPPED | OUTPATIENT
Start: 2023-07-13 | End: 2023-08-12

## 2023-07-13 RX ORDER — OXYCODONE HYDROCHLORIDE 5 MG/1
5 TABLET ORAL EVERY 4 HOURS PRN
Qty: 15 TABLET | Refills: 0 | Status: SHIPPED | OUTPATIENT
Start: 2023-07-13 | End: 2023-07-20

## 2023-07-13 RX ORDER — AMLODIPINE BESYLATE 5 MG/1
10 TABLET ORAL DAILY
Status: DISCONTINUED | OUTPATIENT
Start: 2023-07-13 | End: 2023-07-13 | Stop reason: HOSPADM

## 2023-07-13 RX ORDER — AMLODIPINE BESYLATE 10 MG/1
10 TABLET ORAL DAILY
Qty: 30 TABLET | Refills: 11
Start: 2023-07-13 | End: 2024-07-12

## 2023-07-13 RX ADMIN — INSULIN ASPART 2 UNITS: 100 INJECTION, SOLUTION INTRAVENOUS; SUBCUTANEOUS at 12:07

## 2023-07-13 RX ADMIN — OXYCODONE HYDROCHLORIDE 10 MG: 10 TABLET ORAL at 02:07

## 2023-07-13 RX ADMIN — POLYETHYLENE GLYCOL 3350 17 G: 17 POWDER, FOR SOLUTION ORAL at 09:07

## 2023-07-13 RX ADMIN — AMLODIPINE BESYLATE 10 MG: 5 TABLET ORAL at 09:07

## 2023-07-13 RX ADMIN — PANTOPRAZOLE SODIUM 40 MG: 40 TABLET, DELAYED RELEASE ORAL at 09:07

## 2023-07-13 RX ADMIN — SERTRALINE HYDROCHLORIDE 100 MG: 50 TABLET ORAL at 09:07

## 2023-07-13 RX ADMIN — ENOXAPARIN SODIUM 30 MG: 30 INJECTION SUBCUTANEOUS at 06:07

## 2023-07-13 RX ADMIN — ATORVASTATIN CALCIUM 10 MG: 10 TABLET, FILM COATED ORAL at 09:07

## 2023-07-13 RX ADMIN — CARVEDILOL 3.12 MG: 3.12 TABLET, FILM COATED ORAL at 09:07

## 2023-07-13 NOTE — PLAN OF CARE
[facility]  sent updated clinicals & dc forms to current established  Home health per Debbie- sent to Lorie  via [email or web address]    [facility] sent dc summary, avs & mar to accepting facility [facility] via [email or web address]    Still waiting on 142 to be approved

## 2023-07-13 NOTE — PROGRESS NOTES
[place]  H[place] Medicine Progress Note        Chief Complaint: Inpatient Follow-up for Right forearm fracture     HPI:   87 y.o. female with PMHx of COPD, HTN, HLD, RONAL, DM 2 who initially presented to [facility] on 7/9/2023 via EMS following a fall.  Pt reportedly fell onto her right side, she really hit her head on the counter during the fall.  She had complaints of right wrist pain.  She was given fentanyl 95 mcg via EMS.      It was reported the patient was on Eliquis, however, patient is unsure whether she is on Eliquis, and states she may have been on it before at some point. Eliquis is not listed on her home medications per review of EMR.      Initial ED VS include /50, HR 62, RR 18, SpO2 97%, temperature 97.7° F.  Labs notable for hemoglobin 9.4, hematocrit 28.8, BUN 49.1, creatinine 2.47, glucose 220.  CXR negative.  CT head without contrast negative for acute intracranial abnormality.  She was noted to have a right wrist dislocation, attempts to reduce under sedation ED or unsuccessful.  She was admitted to trauma services.  Orthopedic surgery was consulted for operative reduction. Hospital Medicine team was consulted for medical management.  Patient was seen by Ortho team and had a Right distal radius fracture status post open reduction and internal fixation done. She had mild post operative delirium. This gradually improved. She was participating with PT. Labs and vitals stabilized. She was started on seroquel 25 mg at bedtime for insomnia. CM was working on rehab placement. She will be discharged when accepted.     Interval Hx:   Patient today awake and comfortable. Sitting up in the chair. Oriented to self and place. She is following all verbal commands. States her right arm is swollen and heavy. Denies any arm pain.  Has been afebrile. She is in a good mood.     No family at bedside.     Case was discussed with patient's nurse and  on the  floor.    Objective/physical exam:  General: In no acute distress, Obese   Chest: Clear to auscultation bilaterally  Heart: RRR, +S1, S2, no appreciable murmur  Abdomen: Soft, nontender, BS +  MSK: Right forearm with bandages, + swelling above the dressing. No tenderness over the swelling. Right finger tips good capillary refill. Decreased ROM tight wrist and elbow.   Neurologic: Cranial nerve II-XII intact, Strength Loi LE 5/5, left arm 5/5 and right arm 3/5    VITAL SIGNS: 24 HRS MIN & MAX LAST   Temp  Min: 97.9 °F (36.6 °C)  Max: 99.2 °F (37.3 °C) 97.9 °F (36.6 °C)   BP  Min: 147/77  Max: 179/79 (!) 171/68   Pulse  Min: 76  Max: 92  91   Resp  Min: 16  Max: 20 18   SpO2  Min: 94 %  Max: 96 % 95 %     I have reviewed the following labs:    Recent Labs   Lab 07/11/23  0408 07/12/23  0912 07/13/23  0439   WBC 11.95* 9.82 8.69   RBC 3.21* 3.29* 3.28*   HGB 8.9* 9.2* 9.0*   HCT 28.7* 29.3* 29.1*   MCV 89.4 89.1 88.7   MCH 27.7 28.0 27.4   MCHC 31.0* 31.4* 30.9*   RDW 16.9 16.6 16.6    222 235   MPV 10.8* 10.7* 10.5*       Recent Labs   Lab 07/10/23  0103 07/11/23  0408 07/11/23  1804 07/12/23  0912 07/13/23  0439      < > 138 141 141   K 4.2   < > 4.4 4.0 3.9   CO2 24   < > 22* 25 26   BUN 49.1*   < > 47.8* 41.4* 38.8*   CREATININE 2.47*   < > 2.71* 2.10* 1.85*   CALCIUM 9.4   < > 9.2 9.4 9.6   ALBUMIN 3.9  --   --   --   --    ALKPHOS 114  --   --   --   --    ALT 10  --   --   --   --    AST 13  --   --   --   --    BILITOT 0.5  --   --   --   --     < > = values in this interval not displayed.          Microbiology Results (last 7 days)       ** No results found for the last 168 hours. **             See below for Radiology    Scheduled Med:   amLODIPine  10 mg Oral Daily    atorvastatin  10 mg Oral Daily    carvediloL  3.125 mg Oral BID    enoxparin  30 mg Subcutaneous Q24H    pantoprazole  40 mg Oral Daily    polyethylene glycol  17 g Oral Daily    QUEtiapine  25 mg Oral QHS    sertraline  100 mg  Oral Daily    sodium chloride 0.9%  10 mL Intravenous Q6H        Continuous Infusions:       PRN Meds:  acetaminophen, acetaminophen, albuterol, dextrose 10%, dextrose 10%, glucagon (human recombinant), haloperidol lactate, insulin aspart U-100, LIDOcaine (PF) 10 mg/ml (1%), melatonin, ondansetron, oxyCODONE, Flushing PICC Protocol **AND** sodium chloride 0.9% **AND** sodium chloride 0.9%       Assessment/Plan:  Mild delirium   Acute kidney injury on CKD 3: now at baseline   Fall with Right distal radius fracture status post open reduction and internal fixation Essential hypertension  Diabetes mellitus type 2 with hyperglycemia  RONAL on CPAP   Hyperlipidemia    Plan:  Patient now doing well. Mentals status has improved  She is eating well and participating with PT  Has insomnia, will add seroquel 25 mg daily   BP elevated, increased norvasc to 10 mg daily  Stopped home ARB secondary to CKD   Other current meds reviewed  Renal functions improved, Crt today 1.85, Hb 9  Avoid NSAIDs  Right arm is swollen, will US to r/o DVT. Also will have ortho evaluate today     Cont OT/PT     Will closely monitor patients daily weight, urine out put, renal parameters and volume status      Continue strict aspiration, fall and decubitus precautions      Continue low dose prn norco for pain      VTE prophylaxis: Lovenox    Patient condition:  Fair    Anticipated discharge and Disposition:   rehab when accepted       All diagnosis and differential diagnosis have been reviewed; assessment and plan has been documented; I have personally reviewed the labs and test results that are presently available; I have reviewed the patients medication list; I have reviewed the consulting providers response and recommendations. I have reviewed or attempted to review medical records based upon their availability    All of the patient's questions have been  addressed and answered. Patient's is agreeable to the above stated plan. I will continue to  monitor closely and make adjustments to medical management as needed.  _____________________________________________________________________    Nutrition Status:    Radiology:  I have personally reviewed the following imaging and agree with the radiologist.     US Retroperitoneal Limited  Narrative: EXAMINATION:  US RETROPERITONEAL LIMITED    CLINICAL HISTORY:  ALECIA;    TECHNIQUE:  Multiple sonographic images the kidneys were obtained by department sonographer.    COMPARISON:  None    FINDINGS:  The left kidney is not identified secondary to overlying bowel gas.  The right kidney measures 7.4 cm in length.  No evidence for hydronephrosis or calculus.  The parenchyma demonstrates increased echogenicity with cortical thinning.  Impression: Nonvisualized left kidney.  Likely medical renal disease of the right kidney without evidence for obstructive uropathy.    Electronically signed by: Faizan Herrera MD  Date:    07/10/2023  Time:    21:36  X-Ray Wrist Complete Right  Narrative: EXAMINATION:  Right wrist three views    CLINICAL HISTORY:  Postop evaluation    COMPARISON:  07/10/2023    FINDINGS:  There has been interval open reduction internal fixation noted.  There are 2 screws fixating the distal radius intra-articular fracture now demonstrating near anatomic alignment.  There is dorsal plate and screws extending from the mid radius to 2nd metacarpal.  The hardware appears intact.  The foot is placed ulnar styloid fracture is unchanged.  Impression: Interval postop changes without hardware complication or failure.  Comminuted distal radius intra-articular fracture is in near anatomic alignment.    Electronically signed by: Brain Garcia MD  Date:    07/10/2023  Time:    14:14  SURG FL Surgery Fluoro Usage  See OP Notes for results.     IMPRESSION: See OP Notes for results.     This procedure was auto-finalized by: Virtual Radiologist  X-Ray Wrist 2 View Right  EXAMINATION  XR WRIST 2 VIEW RIGHT    CLINICAL HISTORY  post  reduction ; Pain, unspecified    TECHNIQUE  A total of 2 images submitted of the right wrist.    COMPARISON  9 July 2023    FINDINGS  Interval splinting of the forearm/wrist noted; associated artifact obscures fine osseous detail.  There is no significant interval change in the grossly displaced intra-articular distal radial fracture.  No definite newly displaced cortical injury is identified.  Visualized joints are of similar alignment.  Ulnar styloid fracture is again suspected, without clear visualization of the displaced fragment.    Regional soft tissues are unchanged.    IMPRESSION  Interval splinting with no significant change in fracture alignment.    Electronically signed by: Aren Hall  Date:    07/10/2023  Time:    12:50  X-Ray Wrist Complete Right  EXAMINATION  XR WRIST COMPLETE 3 VIEWS RIGHT    CLINICAL HISTORY  Pain, unspecified    TECHNIQUE  A total of 3 images submitted of the right wrist.    COMPARISON  None available at the time of initial interpretation.    FINDINGS  Acute comminuted fracture with intra-articular extension noted at the distal radial metaphysis.  There is significant lateral and posterior displacement greater than 1 shaft-width.  The displaced distal radial fragment remains well aligned with the carpal structures, although overall gross lateral subluxation of the wrist is evident relative to the distal radial and ulnar shafts.  Ulnar styloid fracture is also suspected, with the fragment component not clearly visualized.    Widespread soft tissue swelling and skin contour deformity noted.  No tracking subcutaneous gas or radiopaque foreign body.    IMPRESSION  1. Acute, markedly displaced distal radial fracture.  2. Cannot exclude ulnar styloid fracture.    Electronically signed by: Aren Hall  Date:    07/10/2023  Time:    12:47  CT Head Without Contrast  Narrative: EXAMINATION:  CT HEAD WITHOUT CONTRAST    CLINICAL HISTORY:  Head trauma, minor (Age >= 65y);    TECHNIQUE:  Low  dose axial images were obtained through the head.  Coronal and sagittal reformations were also performed. Contrast was not administered.    Automatic exposure control was utilized to reduce the patient's radiation dose.    DLP= 1053    COMPARISON:  None.    FINDINGS:  Hemorrhage: No acute intracranial hemorrhage is seen.    CSF spaces: The ventricles, sulci and basal cisterns all appear moderately prominent global cerebral atrophy.    Brain parenchyma: Moderate microvascular change is seen in portions of the periventricular and deep white matter tracts.    Cerebellum: Unremarkable.    Vascular: Unremarkable venous sinuses. Mild atheromatous calcification of the intracranial arteries is seen.    Sella and skull base: The sella appears to be within normal limits for age.    Cerebellopontine angles: Within normal limits.    Herniation: None.    Intracranial calcifications: Incidental note is made of bilateral choroid plexus calcification. Incidental note is made of some pineal region calcification. Incidental note is made of some calcification of the falx.    Calvarium: No acute linear or depressed skull fracture is seen.    Maxillofacial Structures:    Paranasal sinuses: There is some mucoperiosteal thickening right maxillary sinuses and bilateral ethmoid air cells. This is consistent with sinusitis. The rest of the paranasal sinuses appear clear.    Orbits: The orbits appear unremarkable.    Zygomatic arches: The zygomatic arches are intact and unremarkable.    Temporal bones and mastoids: The temporal bones and mastoids appear unremarkable.    TMJ: The mandibular condyles appear normally placed with respect to the mandibular fossa.    Nasal Bones: The nasal septum is midline.    Visualized upper cervical spine: The visualized cervical spine appears unremarkable.  Impression: Impression:    1. No acute intracranial traumatic injury identified. Details and findings as noted above.    Electronically signed by: Vaughn  Colin  Date:    07/10/2023  Time:    07:42  X-Ray Chest AP Portable  Narrative: EXAMINATION:  XR CHEST AP PORTABLE    CLINICAL HISTORY:  pre op;    TECHNIQUE:  Single view of the chest    COMPARISON:  05/22/2023    FINDINGS:  No focal opacification, pleural effusion, or pneumothorax.    The cardiomediastinal silhouette is within normal limits.    No acute osseous abnormality.  Impression: No acute cardiopulmonary process.    Electronically signed by: Vaughn Shaw  Date:    07/10/2023  Time:    07:01      Jorge Luis Gross MD   07/13/2023

## 2023-07-13 NOTE — PLAN OF CARE
Notified son of patient acceptance to [facility] today.

## 2023-07-13 NOTE — PT/OT/SLP PROGRESS
Occupational Therapy   Treatment    Name: Salma Mitchell  MRN: [id number]  Admitting Diagnosis: Fall: R CMC dislocation, closed fx of Distal radius, s/p ORIF of R intra-articular radius fx  3 Days Post-Op    Recommendations:     Discharge Recommendations: rehabilitation facility, nursing facility, skilled  Discharge Equipment Recommendations:  bedside commode  Barriers to discharge:  none    Assessment:     Salma Mitchell is a 87 y.o. female with a medical diagnosis of Fall: R CMC dislocation, closed fx of Distal radius, s/p ORIF of R intra-articular radius fx Performance deficits affecting function are weakness, impaired cognition, orthopedic precautions, impaired self care skills. Pt making good progress towards OT goals.     Rehab Prognosis:  Good; patient would benefit from acute skilled OT services to address these deficits and reach maximum level of function.       Plan:     Patient to be seen 5 x/week to address the above listed problems via self-care/home management, therapeutic activities, therapeutic exercises  Plan of Care Expires: 07/25/23  Plan of Care Reviewed with: patient    Subjective     Pain/Comfort:  Pain Rating 1: 0/10    Objective:     Communicated with: RN prior to session.  Patient found up in chair with chair alarm upon OT entry to room.    General Precautions: Standard, fall    Orthopedic Precautions:RUE non weight bearing (NWB except for ADLs. No ROM of wrist)  Braces: N/A  Respiratory Status: Room air     Occupational Performance:     Functional Mobility/Transfers:  Patient completed Sit <> Stand Transfer with minimum assistance  with  platform walker   Patient completed Toilet Transfer Step Transfer technique with minimum assistance with  platform walker  Functional Mobility: no LOB noted.     Activities of Daily Living:  Toileting: contact guard assistance CGA for toilet hygiene for standing balance    Therapeutic Exercise:  Pt educated on RUE ROM exercises and completed during session.    10 reps shoulder flexion/extension  10 reps elbow flexion/extension      Patient Education:  Patient provided with verbal education regarding OT role/goals/POC, post op precautions, fall prevention, safety awareness, and Discharge/DME recommendations.  Understanding was verbalized.      Patient left up in chair with all lines intact, call button in reach, and chair alarm on    GOALS:   Multidisciplinary Problems       Occupational Therapy Goals          Problem: Occupational Therapy    Goal Priority Disciplines Outcome Interventions   Occupational Therapy Goal     OT, PT/OT Ongoing, Progressing    Description: Goals to be met by: 7/25/23     Patient will increase functional independence with ADLs by performing:    UE Dressing with Stand-by Assistance.  LE Dressing with Stand-by Assistance.  Grooming while standing at sink with Stand-by Assistance.  Toileting from bedside commode with Stand-by Assistance for hygiene and clothing management.   Toilet transfer to bedside commode with Stand-by Assistance.                         Time Tracking:     OT Date of Treatment: 07/13/23  OT Start Time: 0942  OT Stop Time: 1005  OT Total Time (min): 23 min    Billable Minutes:Self Care/Home Management 1 unit  Therapeutic Exercise 1 unit    OT/FAISAL: OT     Number of FAISAL visits since last OT visit: 2    7/13/2023

## 2023-07-13 NOTE — PT/OT/SLP PROGRESS
Physical Therapy Treatment    Patient Name:  Salma Mitchell   MRN:  [id number]    Recommendations:     Discharge Recommendations: nursing facility, skilled, rehabilitation facility  Discharge Equipment Recommendations: platform, walker, rolling  Barriers to discharge: Impaired mobility    Assessment:     Salma Mitchell is a 87 y.o. female admitted with a medical diagnosis of Closed fracture of right distal radius, s/p ORIF. She presents with the following impairments/functional limitations: weakness, impaired endurance, impaired functional mobility, gait instability, impaired balance, decreased safety awareness. Patient pleasant and agreeable to PT treatment. Patient progressing appropriately towards functional PT goals.     Rehab Prognosis: Good; patient would benefit from acute skilled PT services to address these deficits and reach maximum level of function.    Recent Surgery: Procedure(s) (LRB):  ORIF, FRACTURE, RADIUS (Right) 3 Days Post-Op    Plan:     During this hospitalization, patient to be seen 6 x/week to address the identified rehab impairments via gait training, therapeutic activities, therapeutic exercises, neuromuscular re-education and progress toward the following goals:    Plan of Care Expires:  08/18/23    Subjective     Chief Complaint: none stated  Patient/Family Comments/goals: to get stronger  Pain/Comfort:  Pain Rating 1: 0/10      Objective:     Communicated with RN prior to session.  Patient found up in chair with peripheral IV, chair check upon PT entry to room.     General Precautions: Standard, fall  Orthopedic Precautions: RUE non weight bearing (OK for platform walker)  Braces: N/A  Respiratory Status: Room air  Skin Integrity: Visible skin intact    Therapeutic Activities/Exercises:  Patient required Regis for sit<>stand with cues to maintain NWB to RUE. Patient ambulated 100ft with Regis and platform walker, noted decreased gait speed, kyphotic posture, and cues for attention to  environment. Pt required assistance and cues for stand to sit. Pt left sitting up in chair with all needs met, chair alarm armed, call bell in reach.     Education:  Patient provided with verbal education regarding PT POC, safety with mobility.  Understanding was verbalized.     Patient left up in chair with all lines intact, call button in reach, and chair alarm on..    GOALS:   Multidisciplinary Problems       Physical Therapy Goals          Problem: Physical Therapy    Goal Priority Disciplines Outcome Goal Variances Interventions   Physical Therapy Goal     PT, PT/OT Ongoing, Progressing     Description: Goals to be met by: 23     Patient will increase functional independence with mobility by performin. Supine to sit with Modified Diamond  2. Sit to stand transfer with Stand-by Assistance  3. Gait  x 150 feet with Stand-by Assistance using Platform walker vs LRAD.                          Time Tracking:     PT Received On: 23  PT Start Time: 1416     PT Stop Time: 1430  PT Total Time (min): 14 min     Billable Minutes: Therapeutic Activity 14min    Treatment Type: Treatment  PT/PTA: PT     Number of PTA visits since last PT visit: 2     2023

## 2023-07-14 NOTE — PHYSICIAN QUERY
"PT Name: Salma Mitchell  MR #: [id number]    DOCUMENTATION CLARIFICATION      CDS/: GILES Green, RN               Contact information:[email or web address]    This form is a permanent document in the medical record.      Query Date: July 14, 2023    By submitting this query, we are merely seeking further clarification of documentation. Please utilize your independent clinical judgment when addressing the question(s) below.    The Medical Record contains the following:  Indicators Supporting Clinical Findings Location in Medical Record    x BMI                          BMI: 40.5 General Surgery H&P 07/10/2023    x Height/Weight HT: 5' 1" (154.9 cm)            WT: 97.1 kg (214 lb)  General Surgery H&P 07/10/2023    x Acute/Chronic Illness Fall with Right distal radius fracture status post open reduction and internal fixation; Essential hypertension; Mild delirium; Acute kidney injury on CKD 3: now at baseline; Diabetes mellitus type 2 with hyperglycemia; RONAL on CPAP; Hyperlipidemia  Hospital Medicine Progress Notes 07/13/2023    x Treatment Will closely monitor patients daily weight Hospital Medicine Progress Notes 07/13/2023    x Other                         Obese Hospital Medicine Progress Notes 07/13/2023       Provider, please specify the diagnosis associated with the above clinical findings.   [  x ] Obesity   [   ] Morbid Obesity   [   ] Other diagnosis (please specify): _________   [  ] Clinically Undetermined       Please document in your progress notes daily for the duration of treatment until resolved and include in your discharge summary.  Form 22703    "

## 2023-07-25 NOTE — DISCHARGE SUMMARY
87 y.o. female with PMHx of COPD, HTN, HLD, RONAL, DM 2 who initially presented to [facility] on 7/9/2023 via EMS following a fall.  Pt reportedly fell onto her right side, she really hit her head on the counter during the fall.  She had complaints of right wrist pain.  She was given fentanyl 95 mcg via EMS.      It was reported the patient was on Eliquis, however, patient is unsure whether she is on Eliquis, and states she may have been on it before at some point. Eliquis is not listed on her home medications per review of EMR.      Initial ED VS include /50, HR 62, RR 18, SpO2 97%, temperature 97.7° F.  Labs notable for hemoglobin 9.4, hematocrit 28.8, BUN 49.1, creatinine 2.47, glucose 220.  CXR negative.  CT head without contrast negative for acute intracranial abnormality.  She was noted to have a right wrist dislocation, attempts to reduce under sedation ED or unsuccessful.  She was admitted to trauma services.  Orthopedic surgery was consulted for operative reduction. Hospital Medicine team was consulted for medical management.  Patient was seen by Ortho team and had a Right distal radius fracture status post open reduction and internal fixation done. She had mild post operative delirium. This gradually improved. She was participating with PT. Labs and vitals stabilized. She was started on seroquel 25 mg at bedtime for insomnia. CM was working on rehab placement. She will be discharged when accepted.      Interval Hx:   Patient today awake and comfortable. Sitting up in the chair. Oriented to self and place. She is following all verbal commands. States her right arm is swollen and heavy. Denies any arm pain.  Has been afebrile. She is in a good mood.      No family at bedside.      Case was discussed with patient's nurse and  on the floor.     Objective/physical exam:  General: In no acute distress, Obese   Chest: Clear to auscultation bilaterally  Heart: RRR, +S1, S2, no appreciable  murmur  Abdomen: Soft, nontender, BS +  MSK: Right forearm with bandages, + swelling above the dressing. No tenderness over the swelling. Right finger tips good capillary refill. Decreased ROM tight wrist and elbow.   Neurologic: Cranial nerve II-XII intact, Strength Loi LE 5/5, left arm 5/5 and right arm 3/5     VITAL SIGNS: 24 HRS MIN & MAX LAST   Temp  Min: 97.9 °F (36.6 °C)  Max: 99.2 °F (37.3 °C) 97.9 °F (36.6 °C)   BP  Min: 147/77  Max: 179/79 (!) 171/68   Pulse  Min: 76  Max: 92  91   Resp  Min: 16  Max: 20 18   SpO2  Min: 94 %  Max: 96 % 95 %      I have reviewed the following labs:           Recent Labs   Lab 07/11/23  0408 07/12/23  0912 07/13/23  0439   WBC 11.95* 9.82 8.69   RBC 3.21* 3.29* 3.28*   HGB 8.9* 9.2* 9.0*   HCT 28.7* 29.3* 29.1*   MCV 89.4 89.1 88.7   MCH 27.7 28.0 27.4   MCHC 31.0* 31.4* 30.9*   RDW 16.9 16.6 16.6    222 235   MPV 10.8* 10.7* 10.5*                 Recent Labs   Lab 07/10/23  0103 07/11/23  0408 07/11/23  1804 07/12/23  0912 07/13/23  0439      < > 138 141 141   K 4.2   < > 4.4 4.0 3.9   CO2 24   < > 22* 25 26   BUN 49.1*   < > 47.8* 41.4* 38.8*   CREATININE 2.47*   < > 2.71* 2.10* 1.85*   CALCIUM 9.4   < > 9.2 9.4 9.6   ALBUMIN 3.9  --   --   --   --    ALKPHOS 114  --   --   --   --    ALT 10  --   --   --   --    AST 13  --   --   --   --    BILITOT 0.5  --   --   --   --     < > = values in this interval not displayed.            Microbiology Results (last 7 days)         ** No results found for the last 168 hours. **                See below for Radiology     Scheduled Med:   amLODIPine  10 mg Oral Daily    atorvastatin  10 mg Oral Daily    carvediloL  3.125 mg Oral BID    enoxparin  30 mg Subcutaneous Q24H    pantoprazole  40 mg Oral Daily    polyethylene glycol  17 g Oral Daily    QUEtiapine  25 mg Oral QHS    sertraline  100 mg Oral Daily    sodium chloride 0.9%  10 mL Intravenous Q6H         Continuous Infusions:        PRN Meds:  acetaminophen,  acetaminophen, albuterol, dextrose 10%, dextrose 10%, glucagon (human recombinant), haloperidol lactate, insulin aspart U-100, LIDOcaine (PF) 10 mg/ml (1%), melatonin, ondansetron, oxyCODONE, Flushing PICC Protocol **AND** sodium chloride 0.9% **AND** sodium chloride 0.9%         Assessment/Plan:  Mild delirium   Acute kidney injury on CKD 3: now at baseline   Fall with Right distal radius fracture status post open reduction and internal fixation Essential hypertension  Diabetes mellitus type 2 with hyperglycemia  RONAL on CPAP   Hyperlipidemia     Plan:  Patient now doing well. Mentals status has improved  She is eating well and participating with PT  Has insomnia, will add seroquel 25 mg daily   BP elevated, increased norvasc to 10 mg daily  Stopped home ARB secondary to CKD   Other current meds reviewed  Renal functions improved, Crt today 1.85, Hb 9  Avoid NSAIDs  Right arm is swollen, will US to r/o DVT. Also will have ortho evaluate today      Cont OT/PT      Will closely monitor patients daily weight, urine out put, renal parameters and volume status       Continue strict aspiration, fall and decubitus precautions       Continue low dose prn norco for pain        VTE prophylaxis: Lovenox     Patient condition:  Fair     Anticipated discharge and Disposition:   rehab when accepted         All diagnosis and differential diagnosis have been reviewed; assessment and plan has been documented; I have personally reviewed the labs and test results that are presently available; I have reviewed the patients medication list; I have reviewed the consulting providers response and recommendations. I have reviewed or attempted to review medical records based upon their availability     All of the patient's questions have been  addressed and answered. Patient's is agreeable to the above stated plan. I will continue to monitor closely and make adjustments to medical management as  needed.  _____________________________________________________________________     Nutrition Status:     Radiology:  I have personally reviewed the following imaging and agree with the radiologist.      US Retroperitoneal Limited  Narrative: EXAMINATION:  US RETROPERITONEAL LIMITED     CLINICAL HISTORY:  ALECIA;     TECHNIQUE:  Multiple sonographic images the kidneys were obtained by department sonographer.     COMPARISON:  None     FINDINGS:  The left kidney is not identified secondary to overlying bowel gas.  The right kidney measures 7.4 cm in length.  No evidence for hydronephrosis or calculus.  The parenchyma demonstrates increased echogenicity with cortical thinning.  Impression: Nonvisualized left kidney.  Likely medical renal disease of the right kidney without evidence for obstructive uropathy.     Electronically signed by:         Faizan Herrera MD  Date:                                        07/10/2023  Time:                                       21:36  X-Ray Wrist Complete Right  Narrative: EXAMINATION:  Right wrist three views     CLINICAL HISTORY:  Postop evaluation     COMPARISON:  07/10/2023     FINDINGS:  There has been interval open reduction internal fixation noted.  There are 2 screws fixating the distal radius intra-articular fracture now demonstrating near anatomic alignment.  There is dorsal plate and screws extending from the mid radius to 2nd metacarpal.  The hardware appears intact.  The foot is placed ulnar styloid fracture is unchanged.  Impression: Interval postop changes without hardware complication or failure.  Comminuted distal radius intra-articular fracture is in near anatomic alignment.     Electronically signed by:         Brain Garcia MD  Date:                                        07/10/2023  Time:                                       14:14  SURG FL Surgery Fluoro Usage  See OP Notes for results.      IMPRESSION: See OP Notes for results.      This procedure was auto-finalized  by: Virtual Radiologist  X-Ray Wrist 2 View Right  EXAMINATION  XR WRIST 2 VIEW RIGHT     CLINICAL HISTORY  post reduction ; Pain, unspecified     TECHNIQUE  A total of 2 images submitted of the right wrist.     COMPARISON  9 July 2023     FINDINGS  Interval splinting of the forearm/wrist noted; associated artifact obscures fine osseous detail.  There is no significant interval change in the grossly displaced intra-articular distal radial fracture.  No definite newly displaced cortical injury is identified.  Visualized joints are of similar alignment.  Ulnar styloid fracture is again suspected, without clear visualization of the displaced fragment.     Regional soft tissues are unchanged.     IMPRESSION  Interval splinting with no significant change in fracture alignment.     Electronically signed by:         Aren Hall  Date:                                        07/10/2023  Time:                                       12:50  X-Ray Wrist Complete Right  EXAMINATION  XR WRIST COMPLETE 3 VIEWS RIGHT     CLINICAL HISTORY  Pain, unspecified     TECHNIQUE  A total of 3 images submitted of the right wrist.     COMPARISON  None available at the time of initial interpretation.     FINDINGS  Acute comminuted fracture with intra-articular extension noted at the distal radial metaphysis.  There is significant lateral and posterior displacement greater than 1 shaft-width.  The displaced distal radial fragment remains well aligned with the carpal structures, although overall gross lateral subluxation of the wrist is evident relative to the distal radial and ulnar shafts.  Ulnar styloid fracture is also suspected, with the fragment component not clearly visualized.     Widespread soft tissue swelling and skin contour deformity noted.  No tracking subcutaneous gas or radiopaque foreign body.     IMPRESSION  1. Acute, markedly displaced distal radial fracture.  2. Cannot exclude ulnar styloid fracture.     Electronically signed  by:         Aren Hall  Date:                                        07/10/2023  Time:                                       12:47  CT Head Without Contrast  Narrative: EXAMINATION:  CT HEAD WITHOUT CONTRAST     CLINICAL HISTORY:  Head trauma, minor (Age >= 65y);     TECHNIQUE:  Low dose axial images were obtained through the head.  Coronal and sagittal reformations were also performed. Contrast was not administered.     Automatic exposure control was utilized to reduce the patient's radiation dose.     DLP= 1053     COMPARISON:  None.     FINDINGS:  Hemorrhage: No acute intracranial hemorrhage is seen.     CSF spaces: The ventricles, sulci and basal cisterns all appear moderately prominent global cerebral atrophy.     Brain parenchyma: Moderate microvascular change is seen in portions of the periventricular and deep white matter tracts.     Cerebellum: Unremarkable.     Vascular: Unremarkable venous sinuses. Mild atheromatous calcification of the intracranial arteries is seen.     Sella and skull base: The sella appears to be within normal limits for age.     Cerebellopontine angles: Within normal limits.     Herniation: None.     Intracranial calcifications: Incidental note is made of bilateral choroid plexus calcification. Incidental note is made of some pineal region calcification. Incidental note is made of some calcification of the falx.     Calvarium: No acute linear or depressed skull fracture is seen.     Maxillofacial Structures:     Paranasal sinuses: There is some mucoperiosteal thickening right maxillary sinuses and bilateral ethmoid air cells. This is consistent with sinusitis. The rest of the paranasal sinuses appear clear.     Orbits: The orbits appear unremarkable.     Zygomatic arches: The zygomatic arches are intact and unremarkable.     Temporal bones and mastoids: The temporal bones and mastoids appear unremarkable.     TMJ: The mandibular condyles appear normally placed with respect to the  mandibular fossa.     Nasal Bones: The nasal septum is midline.     Visualized upper cervical spine: The visualized cervical spine appears unremarkable.  Impression: Impression:     1. No acute intracranial traumatic injury identified. Details and findings as noted above.     Electronically signed by:         Vaughn Shaw  Date:                                        07/10/2023  Time:                                       07:42  X-Ray Chest AP Portable  Narrative: EXAMINATION:  XR CHEST AP PORTABLE     CLINICAL HISTORY:  pre op;     TECHNIQUE:  Single view of the chest     COMPARISON:  05/22/2023     FINDINGS:  No focal opacification, pleural effusion, or pneumothorax.     The cardiomediastinal silhouette is within normal limits.     No acute osseous abnormality.  Impression: No acute cardiopulmonary process.     Electronically signed by:         Vaughn Shaw  Date:                                        07/10/2023  Time:                                       07:01        Jorge Luis Gross MD   07/13/2023                         Dc to rehab     Dc 31 minutes

## 2023-07-27 ENCOUNTER — OFFICE VISIT (OUTPATIENT)
Dept: ORTHOPEDICS | Facility: CLINIC | Age: 88
End: 2023-07-27
Payer: MEDICARE

## 2023-07-27 VITALS
SYSTOLIC BLOOD PRESSURE: 164 MMHG | DIASTOLIC BLOOD PRESSURE: 71 MMHG | WEIGHT: 214 LBS | HEART RATE: 94 BPM | BODY MASS INDEX: 40.4 KG/M2 | HEIGHT: 61 IN

## 2023-07-27 DIAGNOSIS — S52.501A CLOSED FRACTURE OF DISTAL END OF RIGHT RADIUS, UNSPECIFIED FRACTURE MORPHOLOGY, INITIAL ENCOUNTER: Primary | ICD-10-CM

## 2023-07-27 PROCEDURE — 99024 PR POST-OP FOLLOW-UP VISIT: ICD-10-PCS | Mod: POP,,, | Performed by: NURSE PRACTITIONER

## 2023-07-27 PROCEDURE — 99024 POSTOP FOLLOW-UP VISIT: CPT | Mod: POP,,, | Performed by: NURSE PRACTITIONER

## 2023-07-27 RX ORDER — OXYCODONE AND ACETAMINOPHEN 7.5; 325 MG/1; MG/1
1 TABLET ORAL EVERY 6 HOURS PRN
COMMUNITY
Start: 2022-12-21

## 2023-07-27 RX ORDER — ALPRAZOLAM 0.25 MG/1
TABLET ORAL 3 TIMES DAILY
COMMUNITY

## 2023-07-27 NOTE — PROGRESS NOTES
Subjective:       Patient ID: Salma Mitchell is a 88 y.o. female.    Chief Complaint   Patient presents with    Right Wrist - Post-op Evaluation     2.5 week f/u from ORIF right distal radius fx. No complaints of pain or discomfort. Present in splint.           Patient is here today for a follow-up evaluation.  Two and half weeks out from open reduction internal fixation of right distal radius fracture with a dorsal spanning plate.  She is doing well today.  She denies pain to the wrist.  She comes in with her splint intact.  She has not had any fever.  She is compliant with her nonweightbearing status of the wrist.  She reports mild stiffness in the digits.  No other issues or complaints were reported.      Review of Systems   Constitutional: Negative for chills and fever.   HENT:  Negative for congestion and hearing loss.    Eyes:  Negative for visual disturbance.   Cardiovascular:  Negative for chest pain and syncope.   Respiratory:  Negative for cough and shortness of breath.    Hematologic/Lymphatic: Does not bruise/bleed easily.   Skin:  Negative for color change and rash.   Gastrointestinal:  Negative for abdominal pain, nausea and vomiting.   Genitourinary:  Negative for dysuria and hematuria.   Neurological:  Negative for numbness, sensory change and weakness.   Psychiatric/Behavioral:  Negative for altered mental status.       Current Outpatient Medications on File Prior to Visit   Medication Sig Dispense Refill    ADVAIR DISKUS 250-50 mcg/dose diskus inhaler Inhale 1 puff into the lungs 2 (two) times daily.      albuterol (PROVENTIL/VENTOLIN HFA) 90 mcg/actuation inhaler Inhale 2 puffs into the lungs every 6 (six) hours as needed for Wheezing. Rescue      ALPRAZolam (XANAX) 0.25 MG tablet Take by mouth 3 (three) times daily.      amLODIPine (NORVASC) 10 MG tablet Take 1 tablet (10 mg total) by mouth once daily. 30 tablet 11    atorvastatin (LIPITOR) 10 MG tablet Take 10 mg by mouth once daily.       "carvediloL (COREG) 3.125 MG tablet Take 1 tablet (3.125 mg total) by mouth 2 (two) times daily. 60 tablet 11    gabapentin (NEURONTIN) 300 MG capsule Take 300 mg by mouth 3 (three) times daily.      glipiZIDE (GLUCOTROL) 10 MG TR24 Take 5 mg by mouth daily with breakfast.      oxyCODONE-acetaminophen (PERCOCET) 7.5-325 mg per tablet Take 1 tablet by mouth every 6 (six) hours as needed.      pantoprazole (PROTONIX) 40 MG tablet Take 40 mg by mouth once daily.      polyethylene glycol (GLYCOLAX) 17 gram PwPk Take 17 g by mouth once daily.      QUEtiapine (SEROQUEL) 25 MG Tab Take 1 tablet (25 mg total) by mouth every evening. 30 tablet 0    sertraline (ZOLOFT) 100 MG tablet Take 100 mg by mouth once daily.       No current facility-administered medications on file prior to visit.          Objective:      BP (!) 164/71   Pulse 94   Ht 5' 1" (1.549 m)   Wt 97.1 kg (214 lb)   BMI 40.43 kg/m²   Physical Exam  Constitutional:       General: She is not in acute distress.     Appearance: Normal appearance.   HENT:      Head: Normocephalic and atraumatic.      Mouth/Throat:      Mouth: Mucous membranes are moist.   Eyes:      Extraocular Movements: Extraocular movements intact.   Cardiovascular:      Rate and Rhythm: Normal rate.      Pulses: Normal pulses.   Pulmonary:      Effort: Pulmonary effort is normal. No respiratory distress.   Abdominal:      General: There is no distension.      Palpations: Abdomen is soft.      Tenderness: There is no abdominal tenderness.   Musculoskeletal:      Cervical back: Normal range of motion and neck supple.      Comments: Right wrist:  Surgical incisions are well healed with no signs of infection.  No painful or prominent hardware.  She has some resolving ecchymosis to the wrist.  Minimal swelling.  Compartments are soft and compressible.  Mild stiffness with pronation and supination of the wrist.  She has no dorsiflexion and volar flexion due to her dorsal spanning plate.  2+ radial " pulse.  She can flex and extend the digits actively, mild stiffness with flexion.  Brisk capillary refill distally.  Sensation to light touch intact distally.   Neurological:      Mental Status: She is alert and oriented to person, place, and time. Mental status is at baseline.   Psychiatric:         Mood and Affect: Mood normal.         Behavior: Behavior normal.         Thought Content: Thought content normal.         Judgment: Judgment normal.      Body mass index is 40.43 kg/m².    Radiology:  no new films        Assessment:         1. Closed fracture of distal end of right radius, unspecified fracture morphology, initial encounter                Plan:     Patient is doing well 2 and half weeks out from open reduction internal fixation of right distal radius fracture.  Surgical incisions are well healed with no signs of infection.  Sutures were removed in the office.  She may shower with antibacterial soap and water, pat dry, leave open to air.  Remain nonweightbearing with no lifting, pushing, pulling greater than 2 lb.  She was fitted for a Velcro wrist brace.  She can work on pronation and supination exercises as well as range of motion to the digits.  We will see her back in 1 month for repeat x-rays and evaluation.  All questions and concerns were addressed.  Patient understands and agrees with the plan.  Updated progress note and orders were provided for her skilled nursing facility.    The above findings, diagnosis, and treatment plan were discussed with Dr. Khoi Castaneda who is in agreement.      Follow up in about 4 weeks (around 8/24/2023).    Closed fracture of distal end of right radius, unspecified fracture morphology, initial encounter              No orders of the defined types were placed in this encounter.      No future appointments.

## 2023-08-28 ENCOUNTER — OFFICE VISIT (OUTPATIENT)
Dept: ORTHOPEDICS | Facility: CLINIC | Age: 88
End: 2023-08-28
Payer: MEDICARE

## 2023-08-28 ENCOUNTER — HOSPITAL ENCOUNTER (OUTPATIENT)
Dept: RADIOLOGY | Facility: CLINIC | Age: 88
Discharge: HOME | End: 2023-08-28
Attending: ORTHOPAEDIC SURGERY
Payer: MEDICARE

## 2023-08-28 VITALS
WEIGHT: 214.06 LBS | RESPIRATION RATE: 18 BRPM | SYSTOLIC BLOOD PRESSURE: 150 MMHG | HEART RATE: 74 BPM | BODY MASS INDEX: 40.42 KG/M2 | DIASTOLIC BLOOD PRESSURE: 71 MMHG | HEIGHT: 61 IN

## 2023-08-28 DIAGNOSIS — S52.501A CLOSED FRACTURE OF DISTAL END OF RIGHT RADIUS, UNSPECIFIED FRACTURE MORPHOLOGY, INITIAL ENCOUNTER: ICD-10-CM

## 2023-08-28 DIAGNOSIS — S52.501A CLOSED FRACTURE OF DISTAL END OF RIGHT RADIUS, UNSPECIFIED FRACTURE MORPHOLOGY, INITIAL ENCOUNTER: Primary | ICD-10-CM

## 2023-08-28 PROCEDURE — 99024 PR POST-OP FOLLOW-UP VISIT: ICD-10-PCS | Mod: POP,,, | Performed by: ORTHOPAEDIC SURGERY

## 2023-08-28 PROCEDURE — 99024 POSTOP FOLLOW-UP VISIT: CPT | Mod: POP,,, | Performed by: ORTHOPAEDIC SURGERY

## 2023-08-28 PROCEDURE — 73110 X-RAY EXAM OF WRIST: CPT | Mod: RT,,, | Performed by: ORTHOPAEDIC SURGERY

## 2023-08-28 PROCEDURE — 73110 XR WRIST COMPLETE 3 VIEWS RIGHT: ICD-10-PCS | Mod: RT,,, | Performed by: ORTHOPAEDIC SURGERY

## 2023-08-28 NOTE — PROGRESS NOTES
Subjective:       Patient ID: Salma Mitchell is a 88 y.o. female.    Chief Complaint   Patient presents with    Right Wrist - Follow-up     7 week f/u orif right distal radius fx.  Not wearing splint today, reports that she forgot it.         Patient is here today for follow-up evaluation 7 weeks status post open reduction internal fixation of right distal radius fracture with cannulated screws and a dorsal spanning plate.  She has been working on range of motion of her digits as well as supination and pronation.  She is very happy with her progress.  Reports some soreness in the hand and wrist as expected but otherwise has been doing very well.  She is currently in a skilled nursing facility.    Follow-up  Pertinent negatives include no abdominal pain, chest pain, chills, congestion, coughing, fever, nausea, neck pain, numbness or vomiting.       Review of Systems   Constitutional: Negative for chills, fever and malaise/fatigue.   HENT:  Negative for congestion and hearing loss.    Eyes:  Negative for visual disturbance.   Cardiovascular:  Negative for chest pain and syncope.   Respiratory:  Negative for cough and shortness of breath.    Hematologic/Lymphatic: Does not bruise/bleed easily.   Skin:  Negative for color change and suspicious lesions.   Musculoskeletal:  Negative for falls and neck pain.   Gastrointestinal:  Negative for abdominal pain, nausea and vomiting.   Genitourinary:  Negative for dysuria and hematuria.   Neurological:  Negative for numbness and sensory change.   Psychiatric/Behavioral:  Negative for altered mental status. The patient is not nervous/anxious.         Current Outpatient Medications on File Prior to Visit   Medication Sig Dispense Refill    ADVAIR DISKUS 250-50 mcg/dose diskus inhaler Inhale 1 puff into the lungs 2 (two) times daily.      albuterol (PROVENTIL/VENTOLIN HFA) 90 mcg/actuation inhaler Inhale 2 puffs into the lungs every 6 (six) hours as needed for Wheezing. Rescue       "ALPRAZolam (XANAX) 0.25 MG tablet Take by mouth 3 (three) times daily.      amLODIPine (NORVASC) 10 MG tablet Take 1 tablet (10 mg total) by mouth once daily. 30 tablet 11    atorvastatin (LIPITOR) 10 MG tablet Take 10 mg by mouth once daily.      carvediloL (COREG) 3.125 MG tablet Take 1 tablet (3.125 mg total) by mouth 2 (two) times daily. 60 tablet 11    gabapentin (NEURONTIN) 300 MG capsule Take 300 mg by mouth 3 (three) times daily.      glipiZIDE (GLUCOTROL) 10 MG TR24 Take 5 mg by mouth daily with breakfast.      oxyCODONE-acetaminophen (PERCOCET) 7.5-325 mg per tablet Take 1 tablet by mouth every 6 (six) hours as needed.      pantoprazole (PROTONIX) 40 MG tablet Take 40 mg by mouth once daily.      polyethylene glycol (GLYCOLAX) 17 gram PwPk Take 17 g by mouth once daily.      sertraline (ZOLOFT) 100 MG tablet Take 100 mg by mouth once daily.      QUEtiapine (SEROQUEL) 25 MG Tab Take 1 tablet (25 mg total) by mouth every evening. 30 tablet 0     No current facility-administered medications on file prior to visit.          Objective:      BP (!) 150/71   Pulse 74   Resp 18   Ht 5' 1" (1.549 m)   Wt 97.1 kg (214 lb 1.1 oz)   BMI 40.45 kg/m²   Physical Exam  Constitutional:       General: She is not in acute distress.     Appearance: Normal appearance. She is not ill-appearing.   HENT:      Head: Normocephalic and atraumatic.      Nose: No congestion.   Eyes:      Extraocular Movements: Extraocular movements intact.   Cardiovascular:      Rate and Rhythm: Normal rate and regular rhythm.      Pulses: Normal pulses.   Pulmonary:      Effort: Pulmonary effort is normal.      Breath sounds: Normal breath sounds.   Abdominal:      General: There is no distension.      Palpations: Abdomen is soft.      Tenderness: There is no abdominal tenderness.   Musculoskeletal:      Comments: Right upper extremity:  All surgical incisions are well healed.  No painful or prominent hardware noted.  Intact EPL/FPL, EDC/FDP and " interossei.  She has some slight stiffness with supination and lacks approximately 15° of full supination.  She is able to obtain full pronation.  No attempted dorsiflexion and volar flexion of the wrist.  She is intact sensation to light touch in the median/radial/ulnar distributions with a 2+ radial pulse.   Skin:     General: Skin is warm and dry.   Neurological:      Mental Status: She is alert and oriented to person, place, and time. Mental status is at baseline.   Psychiatric:         Mood and Affect: Mood normal.         Behavior: Behavior normal.         Thought Content: Thought content normal.         Judgment: Judgment normal.        Body mass index is 40.45 kg/m².    Radiology:   Right wrist three views:  Hardware is intact.  Alignment is maintained.  She has evidence of new callus formation noted compared to previous films.      Assessment:         1. Closed fracture of distal end of right radius, unspecified fracture morphology, initial encounter  X-Ray Wrist Complete Right              Plan:       We had an extensive discussion today regarding her fracture and our need for removal of her dorsal spanning plate.  She is reluctant to have another operation because she feels as though the anesthesia caused her to hallucinate she had lots of trouble recovering from her last operation.  We will discuss a regional block in order to remove her hardware with the next once that she can limit the amount of medication.  I do think that she would benefit from removal of the dorsal spanning plate in order to restore the function to her wrist as well as limit the potential complications loosening of the hardware and fracture of the hardware in the future.  I will have her return to see me in 3 weeks to discuss hardware removal.  In that time she and her family plan to discuss what is going to happen for her going forward regarding her living situation.  Prior to her injury she was living alone in an apartment she is  not sure that she will be able to go back to living alone once she leaves the skilled nursing facility.  Lots to discuss with the family, I was able to answer the questions about her wrist today and they understand and agree with this plan.     This note/OR report was created with the assistance of  voice recognition software or phone  dictation.  There may be transcription errors as a result of using this technology however minimal. Effort has been made to assure accuracy of transcription but any obvious errors or omissions should be clarified with the author of the document.       Khoi Castaneda MD  Orthopedic Trauma  [facility]      Follow up in about 3 weeks (around 9/18/2023).    Closed fracture of distal end of right radius, unspecified fracture morphology, initial encounter  -     X-Ray Wrist Complete Right; Future; Expected date: 08/28/2023              Orders Placed This Encounter   Procedures    X-Ray Wrist Complete Right     Standing Status:   Future     Number of Occurrences:   1     Standing Expiration Date:   8/28/2024     Order Specific Question:   May the Radiologist modify the order per protocol to meet the clinical needs of the patient?     Answer:   Yes     Order Specific Question:   Release to patient     Answer:   Immediate       Future Appointments   Date Time Provider Department Center   9/19/2023  2:15 PM Khoi Castaneda MD [facility] [place]

## 2023-09-19 ENCOUNTER — LAB VISIT (OUTPATIENT)
Dept: LAB | Facility: HOSPITAL | Age: 88
End: 2023-09-19
Attending: ORTHOPAEDIC SURGERY
Payer: MEDICARE

## 2023-09-19 ENCOUNTER — OFFICE VISIT (OUTPATIENT)
Dept: ORTHOPEDICS | Facility: CLINIC | Age: 88
End: 2023-09-19
Payer: MEDICARE

## 2023-09-19 ENCOUNTER — HOSPITAL ENCOUNTER (OUTPATIENT)
Dept: RADIOLOGY | Facility: CLINIC | Age: 88
Discharge: HOME | End: 2023-09-19
Attending: ORTHOPAEDIC SURGERY
Payer: MEDICARE

## 2023-09-19 VITALS
SYSTOLIC BLOOD PRESSURE: 169 MMHG | HEART RATE: 68 BPM | BODY MASS INDEX: 39.65 KG/M2 | DIASTOLIC BLOOD PRESSURE: 74 MMHG | WEIGHT: 210 LBS | HEIGHT: 61 IN

## 2023-09-19 DIAGNOSIS — Z96.9 PRESENCE OF RETAINED HARDWARE: ICD-10-CM

## 2023-09-19 DIAGNOSIS — S52.501D CLOSED FRACTURE OF DISTAL END OF RIGHT RADIUS WITH ROUTINE HEALING, UNSPECIFIED FRACTURE MORPHOLOGY, SUBSEQUENT ENCOUNTER: ICD-10-CM

## 2023-09-19 DIAGNOSIS — Z01.818 PREOP TESTING: ICD-10-CM

## 2023-09-19 DIAGNOSIS — S52.501D CLOSED FRACTURE OF DISTAL END OF RIGHT RADIUS WITH ROUTINE HEALING, UNSPECIFIED FRACTURE MORPHOLOGY, SUBSEQUENT ENCOUNTER: Primary | ICD-10-CM

## 2023-09-19 LAB
ALBUMIN SERPL-MCNC: 4.1 G/DL (ref 3.4–4.8)
ALBUMIN/GLOB SERPL: 1.3 RATIO (ref 1.1–2)
ALP SERPL-CCNC: 113 UNIT/L (ref 40–150)
ALT SERPL-CCNC: 12 UNIT/L (ref 0–55)
AST SERPL-CCNC: 19 UNIT/L (ref 5–34)
BASOPHILS # BLD AUTO: 0.05 X10(3)/MCL
BASOPHILS NFR BLD AUTO: 0.5 %
BILIRUB SERPL-MCNC: 0.9 MG/DL
BUN SERPL-MCNC: 18.1 MG/DL (ref 9.8–20.1)
CALCIUM SERPL-MCNC: 9.8 MG/DL (ref 8.4–10.2)
CHLORIDE SERPL-SCNC: 89 MMOL/L (ref 98–107)
CO2 SERPL-SCNC: 27 MMOL/L (ref 23–31)
CREAT SERPL-MCNC: 1.44 MG/DL (ref 0.55–1.02)
EOSINOPHIL # BLD AUTO: 0.13 X10(3)/MCL (ref 0–0.9)
EOSINOPHIL NFR BLD AUTO: 1.3 %
ERYTHROCYTE [DISTWIDTH] IN BLOOD BY AUTOMATED COUNT: 17.2 % (ref 11.5–17)
GFR SERPLBLD CREATININE-BSD FMLA CKD-EPI: 35 MLS/MIN/1.73/M2
GLOBULIN SER-MCNC: 3.2 GM/DL (ref 2.4–3.5)
GLUCOSE SERPL-MCNC: 75 MG/DL (ref 82–115)
HCT VFR BLD AUTO: 34.2 % (ref 37–47)
HGB BLD-MCNC: 11.2 G/DL (ref 12–16)
IMM GRANULOCYTES # BLD AUTO: 0.05 X10(3)/MCL (ref 0–0.04)
IMM GRANULOCYTES NFR BLD AUTO: 0.5 %
LYMPHOCYTES # BLD AUTO: 2.1 X10(3)/MCL (ref 0.6–4.6)
LYMPHOCYTES NFR BLD AUTO: 20.4 %
MCH RBC QN AUTO: 29.6 PG (ref 27–31)
MCHC RBC AUTO-ENTMCNC: 32.7 G/DL (ref 33–36)
MCV RBC AUTO: 90.2 FL (ref 80–94)
MONOCYTES # BLD AUTO: 1.29 X10(3)/MCL (ref 0.1–1.3)
MONOCYTES NFR BLD AUTO: 12.5 %
NEUTROPHILS # BLD AUTO: 6.66 X10(3)/MCL (ref 2.1–9.2)
NEUTROPHILS NFR BLD AUTO: 64.8 %
NRBC BLD AUTO-RTO: 0 %
PLATELET # BLD AUTO: 267 X10(3)/MCL (ref 130–400)
PMV BLD AUTO: 9.9 FL (ref 7.4–10.4)
POTASSIUM SERPL-SCNC: 3.8 MMOL/L (ref 3.5–5.1)
PROT SERPL-MCNC: 7.3 GM/DL (ref 5.8–7.6)
RBC # BLD AUTO: 3.79 X10(6)/MCL (ref 4.2–5.4)
SODIUM SERPL-SCNC: 130 MMOL/L (ref 136–145)
WBC # SPEC AUTO: 10.28 X10(3)/MCL (ref 4.5–11.5)

## 2023-09-19 PROCEDURE — 99213 PR OFFICE/OUTPT VISIT, EST, LEVL III, 20-29 MIN: ICD-10-PCS | Mod: 57,,, | Performed by: ORTHOPAEDIC SURGERY

## 2023-09-19 PROCEDURE — 85025 COMPLETE CBC W/AUTO DIFF WBC: CPT

## 2023-09-19 PROCEDURE — 99213 OFFICE O/P EST LOW 20 MIN: CPT | Mod: 57,,, | Performed by: ORTHOPAEDIC SURGERY

## 2023-09-19 PROCEDURE — 73110 XR WRIST COMPLETE 3 VIEWS RIGHT: ICD-10-PCS | Mod: RT,,, | Performed by: ORTHOPAEDIC SURGERY

## 2023-09-19 PROCEDURE — 80053 COMPREHEN METABOLIC PANEL: CPT

## 2023-09-19 PROCEDURE — 73110 X-RAY EXAM OF WRIST: CPT | Mod: RT,,, | Performed by: ORTHOPAEDIC SURGERY

## 2023-09-19 PROCEDURE — 36415 COLL VENOUS BLD VENIPUNCTURE: CPT

## 2023-09-19 RX ORDER — MUPIROCIN 20 MG/G
OINTMENT TOPICAL
Status: CANCELLED | OUTPATIENT
Start: 2023-09-19

## 2023-09-19 NOTE — PROGRESS NOTES
Subjective:       Patient ID: Salma Mitchell is a 88 y.o. female.    Chief Complaint   Patient presents with    Follow-up     ORIF Rt distal radius Sx 7/10/23-GL 10/8/23. patient feeld good and moving around well.        Right distal radius dorsal spanning plate just under 3 months out.  Doing well today.  She is been compliant with her nonweightbearing status to the right upper extremity.  She has had no issues with her incisions.  She reports minimal discomfort in the wrist.  She is ready to get the dorsal spanning plate removed.    Follow-up  Pertinent negatives include no abdominal pain, chest pain, chills, congestion, coughing, fever, nausea, neck pain, numbness or vomiting.       Review of Systems   Constitutional: Negative for chills, fever and malaise/fatigue.   HENT:  Negative for congestion and hearing loss.    Eyes:  Negative for visual disturbance.   Cardiovascular:  Negative for chest pain and syncope.   Respiratory:  Negative for cough and shortness of breath.    Hematologic/Lymphatic: Does not bruise/bleed easily.   Skin:  Negative for color change and suspicious lesions.   Musculoskeletal:  Negative for falls and neck pain.   Gastrointestinal:  Negative for abdominal pain, nausea and vomiting.   Genitourinary:  Negative for dysuria and hematuria.   Neurological:  Negative for numbness and sensory change.   Psychiatric/Behavioral:  Negative for altered mental status. The patient is not nervous/anxious.         Current Outpatient Medications on File Prior to Visit   Medication Sig Dispense Refill    ADVAIR DISKUS 250-50 mcg/dose diskus inhaler Inhale 1 puff into the lungs 2 (two) times daily.      albuterol (PROVENTIL/VENTOLIN HFA) 90 mcg/actuation inhaler Inhale 2 puffs into the lungs every 6 (six) hours as needed for Wheezing. Rescue      ALPRAZolam (XANAX) 0.25 MG tablet Take by mouth 3 (three) times daily.      amLODIPine (NORVASC) 10 MG tablet Take 1 tablet (10 mg total) by mouth once daily. 30  "tablet 11    atorvastatin (LIPITOR) 10 MG tablet Take 10 mg by mouth once daily.      carvediloL (COREG) 3.125 MG tablet Take 1 tablet (3.125 mg total) by mouth 2 (two) times daily. 60 tablet 11    gabapentin (NEURONTIN) 300 MG capsule Take 300 mg by mouth 3 (three) times daily.      glipiZIDE (GLUCOTROL) 10 MG TR24 Take 5 mg by mouth daily with breakfast.      pantoprazole (PROTONIX) 40 MG tablet Take 40 mg by mouth once daily.      polyethylene glycol (GLYCOLAX) 17 gram PwPk Take 17 g by mouth once daily.      sertraline (ZOLOFT) 100 MG tablet Take 100 mg by mouth once daily.      oxyCODONE-acetaminophen (PERCOCET) 7.5-325 mg per tablet Take 1 tablet by mouth every 6 (six) hours as needed.      QUEtiapine (SEROQUEL) 25 MG Tab Take 1 tablet (25 mg total) by mouth every evening. 30 tablet 0     No current facility-administered medications on file prior to visit.          Objective:      BP (!) 169/74 (BP Location: Left forearm, Patient Position: Sitting, BP Method: Small (Automatic))   Pulse 68   Ht 5' 1" (1.549 m)   Wt 95.3 kg (210 lb)   BMI 39.68 kg/m²   Physical Exam  Constitutional:       General: She is not in acute distress.     Appearance: Normal appearance. She is not ill-appearing.   HENT:      Head: Normocephalic and atraumatic.      Nose: No congestion.   Eyes:      Extraocular Movements: Extraocular movements intact.   Cardiovascular:      Rate and Rhythm: Normal rate and regular rhythm.      Pulses: Normal pulses.   Pulmonary:      Effort: Pulmonary effort is normal.      Breath sounds: Normal breath sounds.   Abdominal:      General: There is no distension.      Palpations: Abdomen is soft.      Tenderness: There is no abdominal tenderness.   Musculoskeletal:      Comments: Right upper extremity:  Surgical incisions are well healed.  The hardware is palpable over the 2nd metacarpal though not painful or prominent.  She has good  strength.  Intact EPL/FPL, EDC/FDP and interossei.  She has good " supination and pronation.  No attempted range motion of the wrist is performed.  Full range motion of the elbow.  2+ radial pulse.   Skin:     General: Skin is warm and dry.   Neurological:      Mental Status: She is alert and oriented to person, place, and time. Mental status is at baseline.   Psychiatric:         Mood and Affect: Mood normal.         Behavior: Behavior normal.         Thought Content: Thought content normal.         Judgment: Judgment normal.        Body mass index is 39.68 kg/m².    Radiology:   Right wrist three views: Hardware is intact.  Alignment is maintained.  Evidence of new callus formation noted compared to previous films.      Assessment:         1. Closed fracture of distal end of right radius with routine healing, unspecified fracture morphology, subsequent encounter  X-Ray Wrist Complete Right    Full code    Place in Outpatient    Case Request Operating Room: REMOVAL,ORTHOPEDIC HARDWARE,UPPER EXTREMITY    Vital signs    Insert peripheral IV    Verify informed consent    Diet NPO    Place SUMIT hose    Place sequential compression device    Full code    Insert peripheral IV    Verify informed consent    Diet NPO    Place SUMIT hose    Place sequential compression device      2. Preop testing  CBC Auto Differential    Comprehensive Metabolic Panel      3. Presence of retained hardware                Plan:       She is doing well today and I am happy with her progress.  She will continue no lifting pushing or pulling except for ADLs for right now.  Continue to work on supination pronation and range motion of her digits.  We will plan to return to the operating room in 2 weeks for removal of her dorsal spanning plate in order to begin range motion exercises to the wrist.  She was given Ancef during her last hospitalization and tolerated it well.  Plan for surgery on 10/04 as an outpatient.  She has requested a block instead of a general anesthetic which we will have discussed with our  anesthesia team.  She is happy with this plan of care today and all questions and concerns were addressed.    This note/OR report was created with the assistance of  voice recognition software or phone  dictation.  There may be transcription errors as a result of using this technology however minimal. Effort has been made to assure accuracy of transcription but any obvious errors or omissions should be clarified with the author of the document.       Khoi Castaneda MD  Orthopedic Trauma  [facility]      No follow-ups on file.    Closed fracture of distal end of right radius with routine healing, unspecified fracture morphology, subsequent encounter  -     X-Ray Wrist Complete Right; Future; Expected date: 09/19/2023  -     Full code; Standing  -     Place in Outpatient; Standing  -     Case Request Operating Room: REMOVAL,ORTHOPEDIC HARDWARE,UPPER EXTREMITY  -     Vital signs; Standing  -     Insert peripheral IV; Standing  -     Verify informed consent; Standing  -     Diet NPO; Standing  -     Place SUMIT hose; Standing  -     Place sequential compression device; Standing    Preop testing  -     CBC Auto Differential; Future; Expected date: 09/19/2023  -     Comprehensive Metabolic Panel; Future; Expected date: 09/19/2023    Presence of retained hardware    Other orders  -     ceFAZolin (ANCEF) 2 g in dextrose 5 % (D5W) 50 mL IVPB  -     mupirocin 2 % ointment              Orders Placed This Encounter   Procedures    X-Ray Wrist Complete Right     Standing Status:   Future     Number of Occurrences:   1     Standing Expiration Date:   9/15/2024     Order Specific Question:   May the Radiologist modify the order per protocol to meet the clinical needs of the patient?     Answer:   Yes     Order Specific Question:   Release to patient     Answer:   Immediate    CBC Auto Differential     Standing Status:   Future     Standing Expiration Date:   11/17/2024    Comprehensive Metabolic Panel     Standing  Status:   Future     Standing Expiration Date:   11/17/2024    Diet NPO     Specify start time     Standing Status:   Standing     Number of Occurrences:   1    Verify informed consent     Standing Status:   Standing     Number of Occurrences:   1    Place SUMIT hose     Standing Status:   Standing     Number of Occurrences:   1    Place sequential compression device     Standing Status:   Standing     Number of Occurrences:   1    Full code     Standing Status:   Standing     Number of Occurrences:   1    Insert peripheral IV     Standing Status:   Standing     Number of Occurrences:   1    Case Request Operating Room: REMOVAL,ORTHOPEDIC HARDWARE,UPPER EXTREMITY     Order Specific Question:   Medical Necessity:     Answer:   Medically Urgent [101]     Order Specific Question:   CPT Code:     Answer:   AR REMOVAL DEEP IMPLANT [08438]     Order Specific Question:   Implant Required:     Answer:   No [1001]     Order Specific Question:   Is an on-site pathologist required for this procedure?     Answer:   N/A       No future appointments.

## 2023-09-26 ENCOUNTER — TELEPHONE (OUTPATIENT)
Dept: ORTHOPEDICS | Facility: CLINIC | Age: 88
End: 2023-09-26
Payer: MEDICARE

## 2023-09-28 ENCOUNTER — TELEPHONE (OUTPATIENT)
Dept: ORTHOPEDICS | Facility: CLINIC | Age: 88
End: 2023-09-28
Payer: MEDICARE

## 2023-09-28 NOTE — TELEPHONE ENCOUNTER
Patient's son calls in to cx her sx.  He states she will call when ready for scheduling.  She is returning home from facility tomorrow, and he says she has too much to do. Dr. Castaneda has told patient that the hardware has got to come out, usually at 3 months.  Explained that the plate is not meant for long term, and may brake and cause further complications.  He verbalized understanding, and will call when ready for surgery.

## (undated) DEVICE — GLOVE 8 PROTEXIS PI ORTHO

## (undated) DEVICE — BANDAGE ESMARK 4INX3YD

## (undated) DEVICE — Device

## (undated) DEVICE — DRESSING XEROFORM 1X8IN

## (undated) DEVICE — CUFF ATS 2 PORT SNGL BLDR 24IN

## (undated) DEVICE — COVER FULLGUARD SHOE HIGH-TOP

## (undated) DEVICE — SUT VICRYL BR 1 GEN 27 CT-1

## (undated) DEVICE — ELECTRODE REM POLYHESIVE II

## (undated) DEVICE — SUT ETHILON 3-0 FS-1 30

## (undated) DEVICE — BIT DRILL DVR 2.2MM

## (undated) DEVICE — GLOVE PROTEXIS LTX MICRO 8

## (undated) DEVICE — GLOVE PROTEXIS PI CRM 7

## (undated) DEVICE — DRAPE STERI U-SHAPED 47X51IN

## (undated) DEVICE — APPLICATOR CHLORAPREP ORN 26ML

## (undated) DEVICE — DRESSING GAUZE 6PLY 4X4

## (undated) DEVICE — DRESSING XEROFORM 5X9IN

## (undated) DEVICE — SPONGE GAUZE 4X4 12 PLY STRL

## (undated) DEVICE — TAPE SILK 3IN

## (undated) DEVICE — GLOVE PROTEXIS BLUE LATEX 7

## (undated) DEVICE — GOWN SMARTSLEEVE AAMI LVL4 XL

## (undated) DEVICE — WIRE KIRSCHNER 1.6MM 6IN SS
Type: IMPLANTABLE DEVICE | Site: WRIST | Status: NON-FUNCTIONAL
Removed: 2023-07-10

## (undated) DEVICE — DRAPE HAND STERILE